# Patient Record
Sex: FEMALE | Race: BLACK OR AFRICAN AMERICAN | NOT HISPANIC OR LATINO | Employment: OTHER | URBAN - METROPOLITAN AREA
[De-identification: names, ages, dates, MRNs, and addresses within clinical notes are randomized per-mention and may not be internally consistent; named-entity substitution may affect disease eponyms.]

---

## 2017-01-01 ENCOUNTER — HOSPITAL ENCOUNTER (INPATIENT)
Facility: HOSPITAL | Age: 57
LOS: 3 days | DRG: 871 | End: 2017-03-10
Attending: EMERGENCY MEDICINE | Admitting: INTERNAL MEDICINE
Payer: MEDICAID

## 2017-01-01 VITALS
HEIGHT: 66 IN | RESPIRATION RATE: 41 BRPM | DIASTOLIC BLOOD PRESSURE: 51 MMHG | BODY MASS INDEX: 37.81 KG/M2 | SYSTOLIC BLOOD PRESSURE: 84 MMHG | WEIGHT: 235.25 LBS | HEART RATE: 42 BPM | OXYGEN SATURATION: 69 % | TEMPERATURE: 98 F

## 2017-01-01 DIAGNOSIS — J69.0 ASPIRATION PNEUMONIA OF RIGHT LOWER LOBE DUE TO GASTRIC SECRETIONS: ICD-10-CM

## 2017-01-01 DIAGNOSIS — E11.65 TYPE 2 DIABETES MELLITUS WITH HYPERGLYCEMIA, WITH LONG-TERM CURRENT USE OF INSULIN: ICD-10-CM

## 2017-01-01 DIAGNOSIS — T68.XXXA HYPOTHERMIA, INITIAL ENCOUNTER: ICD-10-CM

## 2017-01-01 DIAGNOSIS — A41.9 SEPTIC SHOCK: ICD-10-CM

## 2017-01-01 DIAGNOSIS — J96.02 ACUTE RESPIRATORY FAILURE WITH HYPERCAPNIA: ICD-10-CM

## 2017-01-01 DIAGNOSIS — G40.201 PARTIAL SYMPTOMATIC EPILEPSY WITH COMPLEX PARTIAL SEIZURES, NOT INTRACTABLE, WITH STATUS EPILEPTICUS: ICD-10-CM

## 2017-01-01 DIAGNOSIS — G93.1 ANOXIC BRAIN INJURY: ICD-10-CM

## 2017-01-01 DIAGNOSIS — G93.1 HYPOXIC ENCEPHALOPATHY: ICD-10-CM

## 2017-01-01 DIAGNOSIS — Z72.0 TOBACCO ABUSE: ICD-10-CM

## 2017-01-01 DIAGNOSIS — N17.9 AKI (ACUTE KIDNEY INJURY): ICD-10-CM

## 2017-01-01 DIAGNOSIS — Z79.4 TYPE 2 DIABETES MELLITUS WITH HYPERGLYCEMIA, WITH LONG-TERM CURRENT USE OF INSULIN: ICD-10-CM

## 2017-01-01 DIAGNOSIS — R40.2432 GLASGOW COMA SCALE TOTAL SCORE 3-8, AT ARRIVAL TO EMERGENCY DEPARTMENT: ICD-10-CM

## 2017-01-01 DIAGNOSIS — E87.8 ELECTROLYTE IMBALANCE: ICD-10-CM

## 2017-01-01 DIAGNOSIS — R41.82 ALTERED MENTAL STATUS: ICD-10-CM

## 2017-01-01 DIAGNOSIS — I46.9 CARDIAC ARREST: Primary | ICD-10-CM

## 2017-01-01 DIAGNOSIS — I25.10 CAD, MULTIPLE VESSEL: Chronic | ICD-10-CM

## 2017-01-01 DIAGNOSIS — J96.90 RESPIRATORY FAILURE: ICD-10-CM

## 2017-01-01 DIAGNOSIS — E87.20 METABOLIC ACIDOSIS: ICD-10-CM

## 2017-01-01 DIAGNOSIS — I25.5 ISCHEMIC CARDIOMYOPATHY: ICD-10-CM

## 2017-01-01 DIAGNOSIS — I49.01 VENTRICULAR FIBRILLATION: ICD-10-CM

## 2017-01-01 DIAGNOSIS — I10 UNCONTROLLED HYPERTENSION: ICD-10-CM

## 2017-01-01 DIAGNOSIS — R65.21 SEPTIC SHOCK: ICD-10-CM

## 2017-01-01 DIAGNOSIS — I95.9 HYPOTENSION, UNSPECIFIED HYPOTENSION TYPE: ICD-10-CM

## 2017-01-01 LAB
ABO + RH BLD: NORMAL
ALBUMIN SERPL BCP-MCNC: 2.9 G/DL
ALBUMIN SERPL BCP-MCNC: 3 G/DL
ALLENS TEST: ABNORMAL
ALP SERPL-CCNC: 122 U/L
ALP SERPL-CCNC: 95 U/L
ALT SERPL W/O P-5'-P-CCNC: 73 U/L
ALT SERPL W/O P-5'-P-CCNC: 82 U/L
ANION GAP SERPL CALC-SCNC: 13 MMOL/L
ANION GAP SERPL CALC-SCNC: 15 MMOL/L
ANION GAP SERPL CALC-SCNC: 17 MMOL/L
ANION GAP SERPL CALC-SCNC: 17 MMOL/L
ANION GAP SERPL CALC-SCNC: 18 MMOL/L
ANION GAP SERPL CALC-SCNC: 18 MMOL/L
ANION GAP SERPL CALC-SCNC: 19 MMOL/L
ANION GAP SERPL CALC-SCNC: 19 MMOL/L
ANION GAP SERPL CALC-SCNC: 20 MMOL/L
ANION GAP SERPL CALC-SCNC: 25 MMOL/L
APTT BLDCRRT: 37.8 SEC
APTT BLDCRRT: 54.5 SEC
APTT BLDCRRT: 54.6 SEC
APTT BLDCRRT: 65.1 SEC
APTT BLDCRRT: 78.1 SEC
APTT BLDCRRT: 79.4 SEC
APTT BLDCRRT: 87.7 SEC
APTT BLDCRRT: >150 SEC
APTT BLDCRRT: >150 SEC
APTT BLDCRRT: NORMAL SEC
APTT BLDCRRT: NORMAL SEC
AST SERPL-CCNC: 100 U/L
AST SERPL-CCNC: 98 U/L
BACTERIA #/AREA URNS HPF: ABNORMAL /HPF
BASOPHILS # BLD AUTO: 0.01 K/UL
BASOPHILS # BLD AUTO: 0.01 K/UL
BASOPHILS # BLD AUTO: ABNORMAL K/UL
BASOPHILS # BLD AUTO: ABNORMAL K/UL
BASOPHILS NFR BLD: 0 %
BASOPHILS NFR BLD: 0.1 %
BILIRUB DIRECT SERPL-MCNC: 0.2 MG/DL
BILIRUB SERPL-MCNC: 0.3 MG/DL
BILIRUB SERPL-MCNC: 0.4 MG/DL
BILIRUB UR QL STRIP: NEGATIVE
BLD GP AB SCN CELLS X3 SERPL QL: NORMAL
BNP SERPL-MCNC: 37 PG/ML
BUN SERPL-MCNC: 15 MG/DL
BUN SERPL-MCNC: 19 MG/DL
BUN SERPL-MCNC: 21 MG/DL
BUN SERPL-MCNC: 22 MG/DL
BUN SERPL-MCNC: 26 MG/DL
CALCIUM SERPL-MCNC: 8.8 MG/DL
CALCIUM SERPL-MCNC: 9 MG/DL
CALCIUM SERPL-MCNC: 9.1 MG/DL
CALCIUM SERPL-MCNC: 9.2 MG/DL
CALCIUM SERPL-MCNC: 9.3 MG/DL
CALCIUM SERPL-MCNC: 9.4 MG/DL
CALCIUM SERPL-MCNC: 9.5 MG/DL
CALCIUM SERPL-MCNC: 9.5 MG/DL
CHLORIDE SERPL-SCNC: 103 MMOL/L
CHLORIDE SERPL-SCNC: 104 MMOL/L
CHLORIDE SERPL-SCNC: 105 MMOL/L
CHLORIDE SERPL-SCNC: 106 MMOL/L
CHLORIDE SERPL-SCNC: 107 MMOL/L
CHLORIDE SERPL-SCNC: 108 MMOL/L
CHLORIDE SERPL-SCNC: 108 MMOL/L
CHLORIDE SERPL-SCNC: 99 MMOL/L
CHLORIDE SERPL-SCNC: 99 MMOL/L
CK SERPL-CCNC: 102 U/L
CLARITY UR: CLEAR
CO2 SERPL-SCNC: 11 MMOL/L
CO2 SERPL-SCNC: 12 MMOL/L
CO2 SERPL-SCNC: 12 MMOL/L
CO2 SERPL-SCNC: 13 MMOL/L
CO2 SERPL-SCNC: 17 MMOL/L
CO2 SERPL-SCNC: 18 MMOL/L
CO2 SERPL-SCNC: 21 MMOL/L
CO2 SERPL-SCNC: 22 MMOL/L
CO2 SERPL-SCNC: 24 MMOL/L
CO2 SERPL-SCNC: 28 MMOL/L
COLOR UR: YELLOW
CREAT SERPL-MCNC: 1.1 MG/DL
CREAT SERPL-MCNC: 1.2 MG/DL
CREAT SERPL-MCNC: 1.3 MG/DL
CREAT SERPL-MCNC: 1.3 MG/DL
CREAT SERPL-MCNC: 1.4 MG/DL
CREAT SERPL-MCNC: 1.5 MG/DL
CREAT SERPL-MCNC: 1.6 MG/DL
CREAT SERPL-MCNC: 1.6 MG/DL
CREAT SERPL-MCNC: 1.8 MG/DL
CREAT SERPL-MCNC: 2.3 MG/DL
DELSYS: ABNORMAL
DIASTOLIC DYSFUNCTION: NO
DIFFERENTIAL METHOD: ABNORMAL
EOSINOPHIL # BLD AUTO: 0 K/UL
EOSINOPHIL # BLD AUTO: 0 K/UL
EOSINOPHIL # BLD AUTO: ABNORMAL K/UL
EOSINOPHIL # BLD AUTO: ABNORMAL K/UL
EOSINOPHIL NFR BLD: 0 %
EOSINOPHIL NFR BLD: 0 %
EOSINOPHIL NFR BLD: 0.2 %
EOSINOPHIL NFR BLD: 2 %
ERYTHROCYTE [DISTWIDTH] IN BLOOD BY AUTOMATED COUNT: 14.6 %
ERYTHROCYTE [DISTWIDTH] IN BLOOD BY AUTOMATED COUNT: 14.6 %
ERYTHROCYTE [DISTWIDTH] IN BLOOD BY AUTOMATED COUNT: 14.7 %
ERYTHROCYTE [DISTWIDTH] IN BLOOD BY AUTOMATED COUNT: 15.4 %
ERYTHROCYTE [SEDIMENTATION RATE] IN BLOOD BY WESTERGREN METHOD: 20 MM/H
ERYTHROCYTE [SEDIMENTATION RATE] IN BLOOD BY WESTERGREN METHOD: 22 MM/H
EST. GFR  (AFRICAN AMERICAN): 26 ML/MIN/1.73 M^2
EST. GFR  (AFRICAN AMERICAN): 35 ML/MIN/1.73 M^2
EST. GFR  (AFRICAN AMERICAN): 40 ML/MIN/1.73 M^2
EST. GFR  (AFRICAN AMERICAN): 40 ML/MIN/1.73 M^2
EST. GFR  (AFRICAN AMERICAN): 44 ML/MIN/1.73 M^2
EST. GFR  (AFRICAN AMERICAN): 47 ML/MIN/1.73 M^2
EST. GFR  (AFRICAN AMERICAN): 52 ML/MIN/1.73 M^2
EST. GFR  (AFRICAN AMERICAN): 52 ML/MIN/1.73 M^2
EST. GFR  (AFRICAN AMERICAN): 57 ML/MIN/1.73 M^2
EST. GFR  (AFRICAN AMERICAN): >60 ML/MIN/1.73 M^2
EST. GFR  (NON AFRICAN AMERICAN): 23 ML/MIN/1.73 M^2
EST. GFR  (NON AFRICAN AMERICAN): 30 ML/MIN/1.73 M^2
EST. GFR  (NON AFRICAN AMERICAN): 35 ML/MIN/1.73 M^2
EST. GFR  (NON AFRICAN AMERICAN): 35 ML/MIN/1.73 M^2
EST. GFR  (NON AFRICAN AMERICAN): 38 ML/MIN/1.73 M^2
EST. GFR  (NON AFRICAN AMERICAN): 41 ML/MIN/1.73 M^2
EST. GFR  (NON AFRICAN AMERICAN): 45 ML/MIN/1.73 M^2
EST. GFR  (NON AFRICAN AMERICAN): 45 ML/MIN/1.73 M^2
EST. GFR  (NON AFRICAN AMERICAN): 50 ML/MIN/1.73 M^2
EST. GFR  (NON AFRICAN AMERICAN): 55 ML/MIN/1.73 M^2
FIO2: 100
FIO2: 100
FIO2: 30
FIO2: 35
FIO2: 50
GIANT PLATELETS BLD QL SMEAR: PRESENT
GLUCOSE SERPL-MCNC: 173 MG/DL
GLUCOSE SERPL-MCNC: 190 MG/DL
GLUCOSE SERPL-MCNC: 201 MG/DL
GLUCOSE SERPL-MCNC: 207 MG/DL
GLUCOSE SERPL-MCNC: 241 MG/DL
GLUCOSE SERPL-MCNC: 252 MG/DL
GLUCOSE SERPL-MCNC: 253 MG/DL
GLUCOSE SERPL-MCNC: 297 MG/DL
GLUCOSE SERPL-MCNC: 305 MG/DL (ref 70–110)
GLUCOSE SERPL-MCNC: 313 MG/DL
GLUCOSE SERPL-MCNC: 346 MG/DL
GLUCOSE SERPL-MCNC: 386 MG/DL
GLUCOSE SERPL-MCNC: 460 MG/DL
GLUCOSE UR QL STRIP: ABNORMAL
HCO3 UR-SCNC: 15 MMOL/L (ref 24–28)
HCO3 UR-SCNC: 18 MMOL/L (ref 24–28)
HCO3 UR-SCNC: 19.5 MMOL/L (ref 24–28)
HCO3 UR-SCNC: 22.4 MMOL/L (ref 24–28)
HCO3 UR-SCNC: 27.3 MMOL/L (ref 24–28)
HCT VFR BLD AUTO: 35.4 %
HCT VFR BLD AUTO: 35.8 %
HCT VFR BLD AUTO: 40 %
HCT VFR BLD AUTO: 41.5 %
HCT VFR BLD CALC: 44 %PCV (ref 36–54)
HGB BLD-MCNC: 11.9 G/DL
HGB BLD-MCNC: 12.4 G/DL
HGB BLD-MCNC: 12.9 G/DL
HGB BLD-MCNC: 14.1 G/DL
HGB UR QL STRIP: ABNORMAL
HYALINE CASTS #/AREA URNS LPF: 0 /LPF
INR PPP: 1
INR PPP: 1.1
INR PPP: 1.1
KETONES UR QL STRIP: NEGATIVE
LACTATE SERPL-SCNC: 2.5 MMOL/L
LACTATE SERPL-SCNC: 2.9 MMOL/L
LACTATE SERPL-SCNC: 4.9 MMOL/L
LACTATE SERPL-SCNC: 4.9 MMOL/L
LACTATE SERPL-SCNC: 5.6 MMOL/L
LACTATE SERPL-SCNC: 7.1 MMOL/L
LACTATE SERPL-SCNC: 7.7 MMOL/L
LACTATE SERPL-SCNC: 8.7 MMOL/L
LACTATE SERPL-SCNC: 9.4 MMOL/L
LACTATE SERPL-SCNC: >12 MMOL/L
LEUKOCYTE ESTERASE UR QL STRIP: ABNORMAL
LYMPHOCYTES # BLD AUTO: 1.4 K/UL
LYMPHOCYTES # BLD AUTO: 2.1 K/UL
LYMPHOCYTES # BLD AUTO: ABNORMAL K/UL
LYMPHOCYTES # BLD AUTO: ABNORMAL K/UL
LYMPHOCYTES NFR BLD: 19 %
LYMPHOCYTES NFR BLD: 44 %
LYMPHOCYTES NFR BLD: 7.7 %
LYMPHOCYTES NFR BLD: 8.6 %
MAGNESIUM SERPL-MCNC: 1.7 MG/DL
MAGNESIUM SERPL-MCNC: 1.7 MG/DL
MAGNESIUM SERPL-MCNC: 1.8 MG/DL
MAGNESIUM SERPL-MCNC: 2 MG/DL
MAGNESIUM SERPL-MCNC: 2 MG/DL
MAGNESIUM SERPL-MCNC: 2.1 MG/DL
MAGNESIUM SERPL-MCNC: 2.2 MG/DL
MAGNESIUM SERPL-MCNC: 2.3 MG/DL
MCH RBC QN AUTO: 28 PG
MCH RBC QN AUTO: 28.1 PG
MCH RBC QN AUTO: 28.4 PG
MCH RBC QN AUTO: 28.5 PG
MCHC RBC AUTO-ENTMCNC: 32.3 %
MCHC RBC AUTO-ENTMCNC: 33.6 %
MCHC RBC AUTO-ENTMCNC: 34 %
MCHC RBC AUTO-ENTMCNC: 34.6 %
MCV RBC AUTO: 81 FL
MCV RBC AUTO: 83 FL
MCV RBC AUTO: 84 FL
MCV RBC AUTO: 88 FL
METAMYELOCYTES NFR BLD MANUAL: 1 %
METAMYELOCYTES NFR BLD MANUAL: 8 %
MICROSCOPIC COMMENT: ABNORMAL
MODE: ABNORMAL
MONOCYTES # BLD AUTO: 0.7 K/UL
MONOCYTES # BLD AUTO: 1.3 K/UL
MONOCYTES # BLD AUTO: ABNORMAL K/UL
MONOCYTES # BLD AUTO: ABNORMAL K/UL
MONOCYTES NFR BLD: 1 %
MONOCYTES NFR BLD: 3.7 %
MONOCYTES NFR BLD: 4 %
MONOCYTES NFR BLD: 5.1 %
MYELOCYTES NFR BLD MANUAL: 4 %
NEUTROPHILS # BLD AUTO: 16.4 K/UL
NEUTROPHILS # BLD AUTO: 21 K/UL
NEUTROPHILS NFR BLD: 27 %
NEUTROPHILS NFR BLD: 49 %
NEUTROPHILS NFR BLD: 86.9 %
NEUTROPHILS NFR BLD: 88.3 %
NEUTS BAND NFR BLD MANUAL: 3 %
NEUTS BAND NFR BLD MANUAL: 38 %
NITRITE UR QL STRIP: NEGATIVE
NSE SERPL-MCNC: NORMAL UG/L
PATH REV BLD -IMP: NORMAL
PCO2 BLDA: 38.1 MMHG (ref 35–45)
PCO2 BLDA: 38.9 MMHG (ref 35–45)
PCO2 BLDA: 42.3 MMHG (ref 35–45)
PCO2 BLDA: 47.8 MMHG (ref 35–45)
PCO2 BLDA: 54.8 MMHG (ref 35–45)
PEEP: 5
PH SMN: 7.12 [PH] (ref 7.35–7.45)
PH SMN: 7.19 [PH] (ref 7.35–7.45)
PH SMN: 7.22 [PH] (ref 7.35–7.45)
PH SMN: 7.33 [PH] (ref 7.35–7.45)
PH SMN: 7.46 [PH] (ref 7.35–7.45)
PH UR STRIP: 6 [PH] (ref 5–8)
PHOSPHATE SERPL-MCNC: 3.6 MG/DL
PHOSPHATE SERPL-MCNC: 3.8 MG/DL
PHOSPHATE SERPL-MCNC: 4.2 MG/DL
PHOSPHATE SERPL-MCNC: 4.3 MG/DL
PHOSPHATE SERPL-MCNC: 4.7 MG/DL
PHOSPHATE SERPL-MCNC: 4.7 MG/DL
PHOSPHATE SERPL-MCNC: 4.8 MG/DL
PHOSPHATE SERPL-MCNC: 5.3 MG/DL
PHOSPHATE SERPL-MCNC: 5.4 MG/DL
PHOSPHATE SERPL-MCNC: 5.5 MG/DL
PLATELET # BLD AUTO: 214 K/UL
PLATELET # BLD AUTO: 243 K/UL
PLATELET # BLD AUTO: 245 K/UL
PLATELET # BLD AUTO: 287 K/UL
PLATELET BLD QL SMEAR: ABNORMAL
PLATELET BLD QL SMEAR: ABNORMAL
PMV BLD AUTO: 10.5 FL
PMV BLD AUTO: 10.5 FL
PMV BLD AUTO: 11.1 FL
PMV BLD AUTO: 11.2 FL
PO2 BLDA: 192 MMHG (ref 80–100)
PO2 BLDA: 402 MMHG (ref 80–100)
PO2 BLDA: 45 MMHG (ref 80–100)
PO2 BLDA: 53 MMHG (ref 40–60)
PO2 BLDA: 95 MMHG (ref 80–100)
POC BE: -11 MMOL/L
POC BE: -13 MMOL/L
POC BE: -3 MMOL/L
POC BE: -8 MMOL/L
POC BE: 4 MMOL/L
POC IONIZED CALCIUM: 1.2 MMOL/L (ref 1.06–1.42)
POC SATURATED O2: 100 % (ref 95–100)
POC SATURATED O2: 71 % (ref 95–100)
POC SATURATED O2: 75 % (ref 95–100)
POC SATURATED O2: 98 % (ref 95–100)
POC SATURATED O2: 99 % (ref 95–100)
POCT GLUCOSE: 152 MG/DL (ref 70–110)
POCT GLUCOSE: 179 MG/DL (ref 70–110)
POCT GLUCOSE: 183 MG/DL (ref 70–110)
POCT GLUCOSE: 192 MG/DL (ref 70–110)
POCT GLUCOSE: 197 MG/DL (ref 70–110)
POCT GLUCOSE: 197 MG/DL (ref 70–110)
POCT GLUCOSE: 200 MG/DL (ref 70–110)
POCT GLUCOSE: 204 MG/DL (ref 70–110)
POCT GLUCOSE: 209 MG/DL (ref 70–110)
POCT GLUCOSE: 225 MG/DL (ref 70–110)
POCT GLUCOSE: 232 MG/DL (ref 70–110)
POCT GLUCOSE: 302 MG/DL (ref 70–110)
POCT GLUCOSE: 358 MG/DL (ref 70–110)
POTASSIUM BLD-SCNC: 3 MMOL/L (ref 3.5–5.1)
POTASSIUM SERPL-SCNC: 2.8 MMOL/L
POTASSIUM SERPL-SCNC: 2.9 MMOL/L
POTASSIUM SERPL-SCNC: 3.2 MMOL/L
POTASSIUM SERPL-SCNC: 3.4 MMOL/L
POTASSIUM SERPL-SCNC: 3.4 MMOL/L
POTASSIUM SERPL-SCNC: 3.6 MMOL/L
POTASSIUM SERPL-SCNC: 3.9 MMOL/L
POTASSIUM SERPL-SCNC: 3.9 MMOL/L
POTASSIUM SERPL-SCNC: 4 MMOL/L
POTASSIUM SERPL-SCNC: 4.1 MMOL/L
POTASSIUM SERPL-SCNC: 4.1 MMOL/L
PROT SERPL-MCNC: 7.1 G/DL
PROT SERPL-MCNC: 7.1 G/DL
PROT UR QL STRIP: ABNORMAL
PROTHROMBIN TIME: 10.6 SEC
PROTHROMBIN TIME: 11 SEC
PROTHROMBIN TIME: 11.3 SEC
RBC # BLD AUTO: 4.25 M/UL
RBC # BLD AUTO: 4.42 M/UL
RBC # BLD AUTO: 4.55 M/UL
RBC # BLD AUTO: 4.95 M/UL
RBC #/AREA URNS HPF: 3 /HPF (ref 0–4)
RETIRED EF AND QEF - SEE NOTES: 60 (ref 55–65)
SAMPLE: ABNORMAL
SITE: ABNORMAL
SODIUM BLD-SCNC: 141 MMOL/L (ref 136–145)
SODIUM SERPL-SCNC: 135 MMOL/L
SODIUM SERPL-SCNC: 137 MMOL/L
SODIUM SERPL-SCNC: 137 MMOL/L
SODIUM SERPL-SCNC: 138 MMOL/L
SODIUM SERPL-SCNC: 139 MMOL/L
SODIUM SERPL-SCNC: 139 MMOL/L
SODIUM SERPL-SCNC: 140 MMOL/L
SP GR UR STRIP: 1.02 (ref 1–1.03)
TRIGL SERPL-MCNC: 380 MG/DL
TROPONIN I SERPL DL<=0.01 NG/ML-MCNC: 0.08 NG/ML
TROPONIN I SERPL DL<=0.01 NG/ML-MCNC: 1.98 NG/ML
TROPONIN I SERPL DL<=0.01 NG/ML-MCNC: 1.98 NG/ML
TROPONIN I SERPL DL<=0.01 NG/ML-MCNC: 4.54 NG/ML
URN SPEC COLLECT METH UR: ABNORMAL
UROBILINOGEN UR STRIP-ACNC: 1 EU/DL
VT: 450
VT: 450
VT: 500
WBC # BLD AUTO: 18.59 K/UL
WBC # BLD AUTO: 24.17 K/UL
WBC # BLD AUTO: 24.32 K/UL
WBC # BLD AUTO: 7.38 K/UL
WBC #/AREA URNS HPF: 5 /HPF (ref 0–5)
WBC CLUMPS URNS QL MICRO: ABNORMAL

## 2017-01-01 PROCEDURE — 85060 BLOOD SMEAR INTERPRETATION: CPT | Mod: ,,, | Performed by: PATHOLOGY

## 2017-01-01 PROCEDURE — 96365 THER/PROPH/DIAG IV INF INIT: CPT

## 2017-01-01 PROCEDURE — 99223 1ST HOSP IP/OBS HIGH 75: CPT | Mod: ,,, | Performed by: INTERNAL MEDICINE

## 2017-01-01 PROCEDURE — 99291 CRITICAL CARE FIRST HOUR: CPT | Mod: 25

## 2017-01-01 PROCEDURE — 99291 CRITICAL CARE FIRST HOUR: CPT | Mod: ,,, | Performed by: NURSE PRACTITIONER

## 2017-01-01 PROCEDURE — 82803 BLOOD GASES ANY COMBINATION: CPT

## 2017-01-01 PROCEDURE — 63600175 PHARM REV CODE 636 W HCPCS: Performed by: EMERGENCY MEDICINE

## 2017-01-01 PROCEDURE — 82962 GLUCOSE BLOOD TEST: CPT

## 2017-01-01 PROCEDURE — 25000003 PHARM REV CODE 250: Performed by: EMERGENCY MEDICINE

## 2017-01-01 PROCEDURE — 25000242 PHARM REV CODE 250 ALT 637 W/ HCPCS: Performed by: NURSE PRACTITIONER

## 2017-01-01 PROCEDURE — 85730 THROMBOPLASTIN TIME PARTIAL: CPT

## 2017-01-01 PROCEDURE — 25000003 PHARM REV CODE 250: Performed by: NURSE PRACTITIONER

## 2017-01-01 PROCEDURE — 85730 THROMBOPLASTIN TIME PARTIAL: CPT | Mod: 91

## 2017-01-01 PROCEDURE — 96366 THER/PROPH/DIAG IV INF ADDON: CPT

## 2017-01-01 PROCEDURE — 85027 COMPLETE CBC AUTOMATED: CPT

## 2017-01-01 PROCEDURE — 83520 IMMUNOASSAY QUANT NOS NONAB: CPT

## 2017-01-01 PROCEDURE — 63600175 PHARM REV CODE 636 W HCPCS: Performed by: NURSE PRACTITIONER

## 2017-01-01 PROCEDURE — 37799 UNLISTED PX VASCULAR SURGERY: CPT

## 2017-01-01 PROCEDURE — 80048 BASIC METABOLIC PNL TOTAL CA: CPT | Mod: 91

## 2017-01-01 PROCEDURE — 99233 SBSQ HOSP IP/OBS HIGH 50: CPT | Mod: ,,, | Performed by: INTERNAL MEDICINE

## 2017-01-01 PROCEDURE — 99291 CRITICAL CARE FIRST HOUR: CPT | Mod: ,,, | Performed by: PSYCHIATRY & NEUROLOGY

## 2017-01-01 PROCEDURE — 63600175 PHARM REV CODE 636 W HCPCS: Performed by: PSYCHIATRY & NEUROLOGY

## 2017-01-01 PROCEDURE — 99900026 HC AIRWAY MAINTENANCE (STAT)

## 2017-01-01 PROCEDURE — 80048 BASIC METABOLIC PNL TOTAL CA: CPT

## 2017-01-01 PROCEDURE — 63600175 PHARM REV CODE 636 W HCPCS: Performed by: INTERNAL MEDICINE

## 2017-01-01 PROCEDURE — 83605 ASSAY OF LACTIC ACID: CPT | Mod: 91

## 2017-01-01 PROCEDURE — 25000003 PHARM REV CODE 250: Performed by: INTERNAL MEDICINE

## 2017-01-01 PROCEDURE — 84132 ASSAY OF SERUM POTASSIUM: CPT

## 2017-01-01 PROCEDURE — 83735 ASSAY OF MAGNESIUM: CPT | Mod: 91

## 2017-01-01 PROCEDURE — 84100 ASSAY OF PHOSPHORUS: CPT | Mod: 91

## 2017-01-01 PROCEDURE — 97802 MEDICAL NUTRITION INDIV IN: CPT

## 2017-01-01 PROCEDURE — 99291 CRITICAL CARE FIRST HOUR: CPT | Mod: 25,,, | Performed by: NURSE PRACTITIONER

## 2017-01-01 PROCEDURE — 99199 UNLISTED SPECIAL SVC PX/RPRT: CPT

## 2017-01-01 PROCEDURE — 87186 SC STD MICRODIL/AGAR DIL: CPT

## 2017-01-01 PROCEDURE — 94640 AIRWAY INHALATION TREATMENT: CPT

## 2017-01-01 PROCEDURE — 85025 COMPLETE CBC W/AUTO DIFF WBC: CPT

## 2017-01-01 PROCEDURE — 99900035 HC TECH TIME PER 15 MIN (STAT)

## 2017-01-01 PROCEDURE — 81000 URINALYSIS NONAUTO W/SCOPE: CPT

## 2017-01-01 PROCEDURE — 83880 ASSAY OF NATRIURETIC PEPTIDE: CPT

## 2017-01-01 PROCEDURE — 85610 PROTHROMBIN TIME: CPT

## 2017-01-01 PROCEDURE — 99232 SBSQ HOSP IP/OBS MODERATE 35: CPT | Mod: ,,, | Performed by: INTERNAL MEDICINE

## 2017-01-01 PROCEDURE — 87147 CULTURE TYPE IMMUNOLOGIC: CPT

## 2017-01-01 PROCEDURE — 84484 ASSAY OF TROPONIN QUANT: CPT | Mod: 91

## 2017-01-01 PROCEDURE — B548ZZA ULTRASONOGRAPHY OF SUPERIOR VENA CAVA, GUIDANCE: ICD-10-PCS | Performed by: PEDIATRICS

## 2017-01-01 PROCEDURE — 93005 ELECTROCARDIOGRAM TRACING: CPT

## 2017-01-01 PROCEDURE — 80076 HEPATIC FUNCTION PANEL: CPT

## 2017-01-01 PROCEDURE — 94003 VENT MGMT INPAT SUBQ DAY: CPT

## 2017-01-01 PROCEDURE — 36556 INSERT NON-TUNNEL CV CATH: CPT

## 2017-01-01 PROCEDURE — 82947 ASSAY GLUCOSE BLOOD QUANT: CPT

## 2017-01-01 PROCEDURE — 20000000 HC ICU ROOM

## 2017-01-01 PROCEDURE — 93306 TTE W/DOPPLER COMPLETE: CPT | Mod: 26,,, | Performed by: INTERNAL MEDICINE

## 2017-01-01 PROCEDURE — 84100 ASSAY OF PHOSPHORUS: CPT

## 2017-01-01 PROCEDURE — 87205 SMEAR GRAM STAIN: CPT

## 2017-01-01 PROCEDURE — 95816 EEG AWAKE AND DROWSY: CPT

## 2017-01-01 PROCEDURE — 80053 COMPREHEN METABOLIC PANEL: CPT

## 2017-01-01 PROCEDURE — 99292 CRITICAL CARE ADDL 30 MIN: CPT | Mod: ,,, | Performed by: NURSE PRACTITIONER

## 2017-01-01 PROCEDURE — 85007 BL SMEAR W/DIFF WBC COUNT: CPT

## 2017-01-01 PROCEDURE — 96375 TX/PRO/DX INJ NEW DRUG ADDON: CPT

## 2017-01-01 PROCEDURE — 93306 TTE W/DOPPLER COMPLETE: CPT

## 2017-01-01 PROCEDURE — 99292 CRITICAL CARE ADDL 30 MIN: CPT | Mod: 25,,, | Performed by: NURSE PRACTITIONER

## 2017-01-01 PROCEDURE — 5A1945Z RESPIRATORY VENTILATION, 24-96 CONSECUTIVE HOURS: ICD-10-PCS | Performed by: EMERGENCY MEDICINE

## 2017-01-01 PROCEDURE — 02HV33Z INSERTION OF INFUSION DEVICE INTO SUPERIOR VENA CAVA, PERCUTANEOUS APPROACH: ICD-10-PCS | Performed by: EMERGENCY MEDICINE

## 2017-01-01 PROCEDURE — 87040 BLOOD CULTURE FOR BACTERIA: CPT | Mod: 59

## 2017-01-01 PROCEDURE — 36620 INSERTION CATHETER ARTERY: CPT | Mod: ,,, | Performed by: NURSE PRACTITIONER

## 2017-01-01 PROCEDURE — 95822 EEG COMA OR SLEEP ONLY: CPT | Mod: 26,,, | Performed by: PSYCHIATRY & NEUROLOGY

## 2017-01-01 PROCEDURE — 36415 COLL VENOUS BLD VENIPUNCTURE: CPT

## 2017-01-01 PROCEDURE — 93010 ELECTROCARDIOGRAM REPORT: CPT | Mod: ,,, | Performed by: INTERNAL MEDICINE

## 2017-01-01 PROCEDURE — 83735 ASSAY OF MAGNESIUM: CPT

## 2017-01-01 PROCEDURE — 82550 ASSAY OF CK (CPK): CPT

## 2017-01-01 PROCEDURE — 86850 RBC ANTIBODY SCREEN: CPT

## 2017-01-01 PROCEDURE — 84478 ASSAY OF TRIGLYCERIDES: CPT

## 2017-01-01 PROCEDURE — 94002 VENT MGMT INPAT INIT DAY: CPT

## 2017-01-01 PROCEDURE — P9612 CATHETERIZE FOR URINE SPEC: HCPCS

## 2017-01-01 PROCEDURE — 86900 BLOOD TYPING SEROLOGIC ABO: CPT

## 2017-01-01 PROCEDURE — 87070 CULTURE OTHR SPECIMN AEROBIC: CPT

## 2017-01-01 PROCEDURE — 87077 CULTURE AEROBIC IDENTIFY: CPT

## 2017-01-01 RX ORDER — MAGNESIUM SULFATE HEPTAHYDRATE 40 MG/ML
2 INJECTION, SOLUTION INTRAVENOUS
Status: COMPLETED | OUTPATIENT
Start: 2017-01-01 | End: 2017-01-01

## 2017-01-01 RX ORDER — INSULIN ASPART 100 [IU]/ML
1-10 INJECTION, SOLUTION INTRAVENOUS; SUBCUTANEOUS EVERY 6 HOURS PRN
Status: DISCONTINUED | OUTPATIENT
Start: 2017-01-01 | End: 2017-01-01

## 2017-01-01 RX ORDER — MORPHINE SULFATE 10 MG/ML
7 INJECTION INTRAMUSCULAR; INTRAVENOUS; SUBCUTANEOUS EVERY 30 MIN PRN
Status: DISCONTINUED | OUTPATIENT
Start: 2017-01-01 | End: 2017-01-01 | Stop reason: HOSPADM

## 2017-01-01 RX ORDER — ASPIRIN 300 MG/1
300 SUPPOSITORY RECTAL
Status: DISCONTINUED | OUTPATIENT
Start: 2017-01-01 | End: 2017-01-01

## 2017-01-01 RX ORDER — AMIODARONE HYDROCHLORIDE 200 MG/1
200 TABLET ORAL 2 TIMES DAILY
Status: DISCONTINUED | OUTPATIENT
Start: 2017-01-01 | End: 2017-01-01

## 2017-01-01 RX ORDER — ACETAMINOPHEN 650 MG/20.3ML
650 LIQUID ORAL EVERY 6 HOURS
Status: DISCONTINUED | OUTPATIENT
Start: 2017-01-01 | End: 2017-01-01

## 2017-01-01 RX ORDER — POTASSIUM CHLORIDE 29.8 MG/ML
40 INJECTION INTRAVENOUS ONCE
Status: COMPLETED | OUTPATIENT
Start: 2017-01-01 | End: 2017-01-01

## 2017-01-01 RX ORDER — POTASSIUM CHLORIDE 29.8 MG/ML
80 INJECTION INTRAVENOUS
Status: DISCONTINUED | OUTPATIENT
Start: 2017-01-01 | End: 2017-01-01

## 2017-01-01 RX ORDER — LEVETIRACETAM 15 MG/ML
1500 INJECTION INTRAVASCULAR EVERY 12 HOURS
Status: DISCONTINUED | OUTPATIENT
Start: 2017-01-01 | End: 2017-01-01

## 2017-01-01 RX ORDER — PROPOFOL 10 MG/ML
5 INJECTION, EMULSION INTRAVENOUS CONTINUOUS
Status: DISCONTINUED | OUTPATIENT
Start: 2017-01-01 | End: 2017-01-01

## 2017-01-01 RX ORDER — MAGNESIUM SULFATE HEPTAHYDRATE 40 MG/ML
2 INJECTION, SOLUTION INTRAVENOUS ONCE
Status: COMPLETED | OUTPATIENT
Start: 2017-01-01 | End: 2017-01-01

## 2017-01-01 RX ORDER — PROPOFOL 10 MG/ML
INJECTION, EMULSION INTRAVENOUS
Status: DISPENSED
Start: 2017-01-01 | End: 2017-01-01

## 2017-01-01 RX ORDER — ASPIRIN 325 MG
325 TABLET ORAL
Status: COMPLETED | OUTPATIENT
Start: 2017-01-01 | End: 2017-01-01

## 2017-01-01 RX ORDER — SODIUM CHLORIDE 9 MG/ML
INJECTION, SOLUTION INTRAVENOUS CONTINUOUS
Status: DISCONTINUED | OUTPATIENT
Start: 2017-01-01 | End: 2017-01-01

## 2017-01-01 RX ORDER — POTASSIUM CHLORIDE 29.8 MG/ML
40 INJECTION INTRAVENOUS
Status: COMPLETED | OUTPATIENT
Start: 2017-01-01 | End: 2017-01-01

## 2017-01-01 RX ORDER — ENOXAPARIN SODIUM 100 MG/ML
60 INJECTION SUBCUTANEOUS
Status: DISCONTINUED | OUTPATIENT
Start: 2017-01-01 | End: 2017-01-01

## 2017-01-01 RX ORDER — LEVETIRACETAM 10 MG/ML
1000 INJECTION INTRAVASCULAR EVERY 12 HOURS
Status: DISCONTINUED | OUTPATIENT
Start: 2017-01-01 | End: 2017-01-01

## 2017-01-01 RX ORDER — LORAZEPAM 2 MG/ML
INJECTION INTRAMUSCULAR
Status: DISPENSED
Start: 2017-01-01 | End: 2017-01-01

## 2017-01-01 RX ORDER — MAGNESIUM SULFATE HEPTAHYDRATE 40 MG/ML
2 INJECTION, SOLUTION INTRAVENOUS
Status: DISCONTINUED | OUTPATIENT
Start: 2017-01-01 | End: 2017-01-01

## 2017-01-01 RX ORDER — LORAZEPAM 2 MG/ML
2 INJECTION INTRAMUSCULAR EVERY 5 MIN PRN
Status: DISCONTINUED | OUTPATIENT
Start: 2017-01-01 | End: 2017-01-01 | Stop reason: HOSPADM

## 2017-01-01 RX ORDER — HEPARIN SODIUM,PORCINE/D5W 25000/250
16 INTRAVENOUS SOLUTION INTRAVENOUS CONTINUOUS
Status: DISCONTINUED | OUTPATIENT
Start: 2017-01-01 | End: 2017-01-01

## 2017-01-01 RX ORDER — METRONIDAZOLE 500 MG/100ML
500 INJECTION, SOLUTION INTRAVENOUS
Status: DISCONTINUED | OUTPATIENT
Start: 2017-01-01 | End: 2017-01-01

## 2017-01-01 RX ORDER — BUSPIRONE HYDROCHLORIDE 10 MG/1
30 TABLET ORAL ONCE
Status: DISCONTINUED | OUTPATIENT
Start: 2017-01-01 | End: 2017-01-01

## 2017-01-01 RX ORDER — HYDRALAZINE HYDROCHLORIDE 20 MG/ML
20 INJECTION INTRAMUSCULAR; INTRAVENOUS EVERY 6 HOURS PRN
Status: DISCONTINUED | OUTPATIENT
Start: 2017-01-01 | End: 2017-01-01

## 2017-01-01 RX ORDER — MEROPENEM AND SODIUM CHLORIDE 500 MG/50ML
500 INJECTION, SOLUTION INTRAVENOUS
Status: DISCONTINUED | OUTPATIENT
Start: 2017-01-01 | End: 2017-01-01

## 2017-01-01 RX ORDER — GLUCAGON 1 MG
1 KIT INJECTION
Status: DISCONTINUED | OUTPATIENT
Start: 2017-01-01 | End: 2017-01-01

## 2017-01-01 RX ORDER — LINEZOLID 2 MG/ML
600 INJECTION, SOLUTION INTRAVENOUS
Status: DISCONTINUED | OUTPATIENT
Start: 2017-01-01 | End: 2017-01-01

## 2017-01-01 RX ORDER — NOREPINEPHRINE BITARTRATE/D5W 4MG/250ML
0.02 PLASTIC BAG, INJECTION (ML) INTRAVENOUS CONTINUOUS
Status: DISCONTINUED | OUTPATIENT
Start: 2017-01-01 | End: 2017-01-01

## 2017-01-01 RX ORDER — ONDANSETRON 2 MG/ML
4 INJECTION INTRAMUSCULAR; INTRAVENOUS
Status: DISCONTINUED | OUTPATIENT
Start: 2017-01-01 | End: 2017-01-01

## 2017-01-01 RX ORDER — LORAZEPAM 2 MG/ML
2 INJECTION INTRAMUSCULAR ONCE
Status: COMPLETED | OUTPATIENT
Start: 2017-01-01 | End: 2017-01-01

## 2017-01-01 RX ORDER — FAMOTIDINE 10 MG/ML
20 INJECTION INTRAVENOUS 2 TIMES DAILY
Status: DISCONTINUED | OUTPATIENT
Start: 2017-01-01 | End: 2017-01-01

## 2017-01-01 RX ORDER — MAGNESIUM SULFATE HEPTAHYDRATE 40 MG/ML
4 INJECTION, SOLUTION INTRAVENOUS
Status: DISCONTINUED | OUTPATIENT
Start: 2017-01-01 | End: 2017-01-01

## 2017-01-01 RX ORDER — FAMOTIDINE 10 MG/ML
20 INJECTION INTRAVENOUS DAILY
Status: DISCONTINUED | OUTPATIENT
Start: 2017-01-01 | End: 2017-01-01

## 2017-01-01 RX ORDER — LORAZEPAM 2 MG/ML
1 INJECTION INTRAMUSCULAR
Status: COMPLETED | OUTPATIENT
Start: 2017-01-01 | End: 2017-01-01

## 2017-01-01 RX ORDER — NAPROXEN SODIUM 220 MG/1
81 TABLET, FILM COATED ORAL DAILY
Status: DISCONTINUED | OUTPATIENT
Start: 2017-01-01 | End: 2017-01-01

## 2017-01-01 RX ORDER — IPRATROPIUM BROMIDE AND ALBUTEROL SULFATE 2.5; .5 MG/3ML; MG/3ML
3 SOLUTION RESPIRATORY (INHALATION) EVERY 6 HOURS
Status: DISCONTINUED | OUTPATIENT
Start: 2017-01-01 | End: 2017-01-01

## 2017-01-01 RX ORDER — MEPERIDINE HYDROCHLORIDE 25 MG/ML
25 INJECTION INTRAMUSCULAR; INTRAVENOUS; SUBCUTANEOUS EVERY 4 HOURS PRN
Status: DISPENSED | OUTPATIENT
Start: 2017-01-01 | End: 2017-01-01

## 2017-01-01 RX ORDER — POTASSIUM CHLORIDE 29.8 MG/ML
40 INJECTION INTRAVENOUS
Status: DISCONTINUED | OUTPATIENT
Start: 2017-01-01 | End: 2017-01-01

## 2017-01-01 RX ORDER — NOREPINEPHRINE BITARTRATE/D5W 4MG/250ML
0.2 PLASTIC BAG, INJECTION (ML) INTRAVENOUS CONTINUOUS
Status: DISCONTINUED | OUTPATIENT
Start: 2017-01-01 | End: 2017-01-01

## 2017-01-01 RX ORDER — METOPROLOL TARTRATE 1 MG/ML
5 INJECTION, SOLUTION INTRAVENOUS EVERY 6 HOURS
Status: DISCONTINUED | OUTPATIENT
Start: 2017-01-01 | End: 2017-01-01

## 2017-01-01 RX ORDER — ATORVASTATIN CALCIUM 10 MG/1
20 TABLET, FILM COATED ORAL DAILY
Status: DISCONTINUED | OUTPATIENT
Start: 2017-01-01 | End: 2017-01-01

## 2017-01-01 RX ORDER — ACETAMINOPHEN 325 MG/1
650 TABLET ORAL EVERY 6 HOURS PRN
Status: DISCONTINUED | OUTPATIENT
Start: 2017-01-01 | End: 2017-01-01

## 2017-01-01 RX ORDER — PROPOFOL 10 MG/ML
20 INJECTION, EMULSION INTRAVENOUS
Status: COMPLETED | OUTPATIENT
Start: 2017-01-01 | End: 2017-01-01

## 2017-01-01 RX ORDER — HYDRALAZINE HYDROCHLORIDE 20 MG/ML
10 INJECTION INTRAMUSCULAR; INTRAVENOUS EVERY 6 HOURS PRN
Status: DISCONTINUED | OUTPATIENT
Start: 2017-01-01 | End: 2017-01-01

## 2017-01-01 RX ORDER — POTASSIUM CHLORIDE 14.9 MG/ML
60 INJECTION INTRAVENOUS
Status: DISCONTINUED | OUTPATIENT
Start: 2017-01-01 | End: 2017-01-01

## 2017-01-01 RX ORDER — MORPHINE SULFATE 2 MG/ML
7 INJECTION, SOLUTION INTRAMUSCULAR; INTRAVENOUS EVERY 30 MIN PRN
Status: DISCONTINUED | OUTPATIENT
Start: 2017-01-01 | End: 2017-01-01 | Stop reason: RX

## 2017-01-01 RX ORDER — CHLORHEXIDINE GLUCONATE ORAL RINSE 1.2 MG/ML
15 SOLUTION DENTAL 2 TIMES DAILY
Status: DISCONTINUED | OUTPATIENT
Start: 2017-01-01 | End: 2017-01-01

## 2017-01-01 RX ORDER — CLOPIDOGREL BISULFATE 75 MG/1
75 TABLET ORAL DAILY
Status: DISCONTINUED | OUTPATIENT
Start: 2017-01-01 | End: 2017-01-01

## 2017-01-01 RX ORDER — DOPAMINE HYDROCHLORIDE 160 MG/100ML
10 INJECTION, SOLUTION INTRAVENOUS CONTINUOUS
Status: DISCONTINUED | OUTPATIENT
Start: 2017-01-01 | End: 2017-01-01

## 2017-01-01 RX ADMIN — MEROPENEM AND SODIUM CHLORIDE 500 MG: 500 INJECTION, SOLUTION INTRAVENOUS at 07:03

## 2017-01-01 RX ADMIN — ASPIRIN 81 MG CHEWABLE TABLET 81 MG: 81 TABLET CHEWABLE at 09:03

## 2017-01-01 RX ADMIN — INSULIN HUMAN 10 UNITS: 100 INJECTION, SOLUTION PARENTERAL at 05:03

## 2017-01-01 RX ADMIN — PROPOFOL 39.97 MCG/KG/MIN: 10 INJECTION, EMULSION INTRAVENOUS at 09:03

## 2017-01-01 RX ADMIN — PROPOFOL 50 MCG/KG/MIN: 10 INJECTION, EMULSION INTRAVENOUS at 09:03

## 2017-01-01 RX ADMIN — HEPARIN SODIUM AND DEXTROSE 9 UNITS/KG/HR: 10000; 5 INJECTION INTRAVENOUS at 05:03

## 2017-01-01 RX ADMIN — AMIODARONE HYDROCHLORIDE 200 MG: 200 TABLET ORAL at 11:03

## 2017-01-01 RX ADMIN — INSULIN ASPART 8 UNITS: 100 INJECTION, SOLUTION INTRAVENOUS; SUBCUTANEOUS at 06:03

## 2017-01-01 RX ADMIN — MAGNESIUM SULFATE IN WATER 2 G: 40 INJECTION, SOLUTION INTRAVENOUS at 07:03

## 2017-01-01 RX ADMIN — MAGNESIUM SULFATE IN WATER 2 G: 40 INJECTION, SOLUTION INTRAVENOUS at 03:03

## 2017-01-01 RX ADMIN — FAMOTIDINE 20 MG: 10 INJECTION, SOLUTION INTRAVENOUS at 04:03

## 2017-01-01 RX ADMIN — HEPARIN SODIUM AND DEXTROSE 13 UNITS/KG/HR: 10000; 5 INJECTION INTRAVENOUS at 01:03

## 2017-01-01 RX ADMIN — SODIUM BICARBONATE: 84 INJECTION, SOLUTION INTRAVENOUS at 04:03

## 2017-01-01 RX ADMIN — PROPOFOL 50 MCG/KG/MIN: 10 INJECTION, EMULSION INTRAVENOUS at 08:03

## 2017-01-01 RX ADMIN — AMIODARONE HYDROCHLORIDE 0.5 MG/MIN: 1.8 INJECTION, SOLUTION INTRAVENOUS at 05:03

## 2017-01-01 RX ADMIN — INSULIN ASPART 2 UNITS: 100 INJECTION, SOLUTION INTRAVENOUS; SUBCUTANEOUS at 03:03

## 2017-01-01 RX ADMIN — ASPIRIN 325 MG ORAL TABLET 325 MG: 325 PILL ORAL at 12:03

## 2017-01-01 RX ADMIN — IPRATROPIUM BROMIDE AND ALBUTEROL SULFATE 3 ML: .5; 3 SOLUTION RESPIRATORY (INHALATION) at 07:03

## 2017-01-01 RX ADMIN — ENOXAPARIN SODIUM 60 MG: 100 INJECTION SUBCUTANEOUS at 12:03

## 2017-01-01 RX ADMIN — PROPOFOL 50 MCG/KG/MIN: 10 INJECTION, EMULSION INTRAVENOUS at 11:03

## 2017-01-01 RX ADMIN — DOPAMINE HYDROCHLORIDE 10 MCG/KG/MIN: 160 INJECTION, SOLUTION INTRAVENOUS at 10:03

## 2017-01-01 RX ADMIN — INSULIN ASPART 2 UNITS: 100 INJECTION, SOLUTION INTRAVENOUS; SUBCUTANEOUS at 12:03

## 2017-01-01 RX ADMIN — METOPROLOL TARTRATE 5 MG: 5 INJECTION INTRAVENOUS at 09:03

## 2017-01-01 RX ADMIN — PROPOFOL 50 MCG/KG/MIN: 10 INJECTION, EMULSION INTRAVENOUS at 02:03

## 2017-01-01 RX ADMIN — AMIODARONE HYDROCHLORIDE 0.5 MG/MIN: 1.8 INJECTION, SOLUTION INTRAVENOUS at 09:03

## 2017-01-01 RX ADMIN — FAMOTIDINE 20 MG: 10 INJECTION, SOLUTION INTRAVENOUS at 08:03

## 2017-01-01 RX ADMIN — LEVETIRACETAM 1500 MG: 15 INJECTION INTRAVENOUS at 09:03

## 2017-01-01 RX ADMIN — INSULIN ASPART 4 UNITS: 100 INJECTION, SOLUTION INTRAVENOUS; SUBCUTANEOUS at 05:03

## 2017-01-01 RX ADMIN — SODIUM CHLORIDE: 0.9 INJECTION, SOLUTION INTRAVENOUS at 09:03

## 2017-01-01 RX ADMIN — AMIODARONE HYDROCHLORIDE 0.5 MG/MIN: 1.8 INJECTION, SOLUTION INTRAVENOUS at 02:03

## 2017-01-01 RX ADMIN — METOPROLOL TARTRATE 5 MG: 5 INJECTION INTRAVENOUS at 05:03

## 2017-01-01 RX ADMIN — ATORVASTATIN CALCIUM 20 MG: 10 TABLET, FILM COATED ORAL at 09:03

## 2017-01-01 RX ADMIN — LORAZEPAM 1 MG: 2 INJECTION, SOLUTION INTRAMUSCULAR; INTRAVENOUS at 09:03

## 2017-01-01 RX ADMIN — PROPOFOL 50 MCG/KG/MIN: 10 INJECTION, EMULSION INTRAVENOUS at 03:03

## 2017-01-01 RX ADMIN — INSULIN ASPART 3 UNITS: 100 INJECTION, SOLUTION INTRAVENOUS; SUBCUTANEOUS at 12:03

## 2017-01-01 RX ADMIN — VECURONIUM BROMIDE 1 MCG/KG/MIN: 1 INJECTION, POWDER, LYOPHILIZED, FOR SOLUTION INTRAVENOUS at 11:03

## 2017-01-01 RX ADMIN — PROPOFOL 50 MCG/KG/MIN: 10 INJECTION, EMULSION INTRAVENOUS at 05:03

## 2017-01-01 RX ADMIN — Medication 0.02 MCG/KG/MIN: at 07:03

## 2017-01-01 RX ADMIN — PROPOFOL 20 MCG/KG/MIN: 10 INJECTION, EMULSION INTRAVENOUS at 09:03

## 2017-01-01 RX ADMIN — INSULIN DETEMIR 10 UNITS: 100 INJECTION, SOLUTION SUBCUTANEOUS at 08:03

## 2017-01-01 RX ADMIN — HYDRALAZINE HYDROCHLORIDE 10 MG: 20 INJECTION INTRAMUSCULAR; INTRAVENOUS at 08:03

## 2017-01-01 RX ADMIN — PROPOFOL 30 MCG/KG/MIN: 10 INJECTION, EMULSION INTRAVENOUS at 12:03

## 2017-01-01 RX ADMIN — CHLORHEXIDINE GLUCONATE 15 ML: 1.2 RINSE ORAL at 08:03

## 2017-01-01 RX ADMIN — INSULIN ASPART 2 UNITS: 100 INJECTION, SOLUTION INTRAVENOUS; SUBCUTANEOUS at 06:03

## 2017-01-01 RX ADMIN — IPRATROPIUM BROMIDE AND ALBUTEROL SULFATE 3 ML: .5; 3 SOLUTION RESPIRATORY (INHALATION) at 01:03

## 2017-01-01 RX ADMIN — NOREPINEPHRINE BITARTRATE 0.3 MCG/KG/MIN: 1 INJECTION, SOLUTION, CONCENTRATE INTRAVENOUS at 08:03

## 2017-01-01 RX ADMIN — METOPROLOL TARTRATE 5 MG: 5 INJECTION INTRAVENOUS at 11:03

## 2017-01-01 RX ADMIN — LEVETIRACETAM 1500 MG: 15 INJECTION INTRAVENOUS at 08:03

## 2017-01-01 RX ADMIN — HEPARIN SODIUM AND DEXTROSE 16 UNITS/KG/HR: 10000; 5 INJECTION INTRAVENOUS at 11:03

## 2017-01-01 RX ADMIN — SODIUM CHLORIDE: 0.9 INJECTION, SOLUTION INTRAVENOUS at 11:03

## 2017-01-01 RX ADMIN — CHLORHEXIDINE GLUCONATE 15 ML: 1.2 RINSE ORAL at 09:03

## 2017-01-01 RX ADMIN — SODIUM CHLORIDE 1000 ML: 0.9 INJECTION, SOLUTION INTRAVENOUS at 07:03

## 2017-01-01 RX ADMIN — PROPOFOL 50 MCG/KG/MIN: 10 INJECTION, EMULSION INTRAVENOUS at 06:03

## 2017-01-01 RX ADMIN — MAGNESIUM SULFATE IN WATER 2 G: 40 INJECTION, SOLUTION INTRAVENOUS at 10:03

## 2017-01-01 RX ADMIN — POTASSIUM CHLORIDE 40 MEQ: 400 INJECTION, SOLUTION INTRAVENOUS at 09:03

## 2017-01-01 RX ADMIN — HEPARIN SODIUM AND DEXTROSE 9.09 UNITS/KG/HR: 10000; 5 INJECTION INTRAVENOUS at 06:03

## 2017-01-01 RX ADMIN — IPRATROPIUM BROMIDE AND ALBUTEROL SULFATE 3 ML: .5; 3 SOLUTION RESPIRATORY (INHALATION) at 12:03

## 2017-01-01 RX ADMIN — INSULIN ASPART 4 UNITS: 100 INJECTION, SOLUTION INTRAVENOUS; SUBCUTANEOUS at 07:03

## 2017-01-01 RX ADMIN — PROPOFOL 30 MCG/KG/MIN: 10 INJECTION, EMULSION INTRAVENOUS at 07:03

## 2017-01-01 RX ADMIN — LORAZEPAM 2 MG: 2 INJECTION, SOLUTION INTRAMUSCULAR; INTRAVENOUS at 06:03

## 2017-01-01 RX ADMIN — PROPOFOL 40 MCG/KG/MIN: 10 INJECTION, EMULSION INTRAVENOUS at 05:03

## 2017-01-01 RX ADMIN — SODIUM BICARBONATE: 84 INJECTION, SOLUTION INTRAVENOUS at 08:03

## 2017-01-01 RX ADMIN — LINEZOLID 600 MG: 600 INJECTION, SOLUTION INTRAVENOUS at 07:03

## 2017-01-01 RX ADMIN — PROPOFOL 50 MCG/KG/MIN: 10 INJECTION, EMULSION INTRAVENOUS at 04:03

## 2017-01-01 RX ADMIN — INSULIN ASPART 1 UNITS: 100 INJECTION, SOLUTION INTRAVENOUS; SUBCUTANEOUS at 11:03

## 2017-01-01 RX ADMIN — AMIODARONE HYDROCHLORIDE 0.5 MG/MIN: 1.8 INJECTION, SOLUTION INTRAVENOUS at 11:03

## 2017-01-01 RX ADMIN — HEPARIN SODIUM AND DEXTROSE 18 UNITS/KG/HR: 10000; 5 INJECTION INTRAVENOUS at 06:03

## 2017-01-01 RX ADMIN — ACETAMINOPHEN 650 MG: 160 SOLUTION ORAL at 12:03

## 2017-01-01 RX ADMIN — POTASSIUM CHLORIDE 40 MEQ: 400 INJECTION, SOLUTION INTRAVENOUS at 04:03

## 2017-01-01 RX ADMIN — AMIODARONE HYDROCHLORIDE 0.5 MG/MIN: 1.8 INJECTION, SOLUTION INTRAVENOUS at 03:03

## 2017-01-01 RX ADMIN — PROPOFOL 40 MCG/KG/MIN: 10 INJECTION, EMULSION INTRAVENOUS at 08:03

## 2017-01-01 RX ADMIN — INSULIN ASPART 4 UNITS: 100 INJECTION, SOLUTION INTRAVENOUS; SUBCUTANEOUS at 06:03

## 2017-01-01 RX ADMIN — HYDRALAZINE HYDROCHLORIDE 10 MG: 20 INJECTION INTRAMUSCULAR; INTRAVENOUS at 03:03

## 2017-01-01 RX ADMIN — PROPOFOL 39.97 MCG/KG/MIN: 10 INJECTION, EMULSION INTRAVENOUS at 02:03

## 2017-01-01 RX ADMIN — INSULIN ASPART 2 UNITS: 100 INJECTION, SOLUTION INTRAVENOUS; SUBCUTANEOUS at 04:03

## 2017-01-01 RX ADMIN — FAMOTIDINE 20 MG: 10 INJECTION INTRAVENOUS at 09:03

## 2017-01-01 RX ADMIN — INSULIN ASPART 4 UNITS: 100 INJECTION, SOLUTION INTRAVENOUS; SUBCUTANEOUS at 11:03

## 2017-01-01 RX ADMIN — ASPIRIN 81 MG CHEWABLE TABLET 81 MG: 81 TABLET CHEWABLE at 12:03

## 2017-01-01 RX ADMIN — SODIUM BICARBONATE: 84 INJECTION, SOLUTION INTRAVENOUS at 11:03

## 2017-01-01 RX ADMIN — MEPERIDINE HYDROCHLORIDE 25 MG: 25 INJECTION INTRAMUSCULAR; INTRAVENOUS; SUBCUTANEOUS at 10:03

## 2017-01-01 RX ADMIN — AMIODARONE HYDROCHLORIDE 0.5 MG/MIN: 1.8 INJECTION, SOLUTION INTRAVENOUS at 10:03

## 2017-01-01 RX ADMIN — POTASSIUM CHLORIDE 60 MEQ: 200 INJECTION, SOLUTION INTRAVENOUS at 10:03

## 2017-01-01 RX ADMIN — FAMOTIDINE 20 MG: 10 INJECTION, SOLUTION INTRAVENOUS at 09:03

## 2017-01-01 RX ADMIN — PROPOFOL 5 MCG/KG/MIN: 10 INJECTION, EMULSION INTRAVENOUS at 10:03

## 2017-01-01 RX ADMIN — PROPOFOL 50 MCG/KG/MIN: 10 INJECTION, EMULSION INTRAVENOUS at 12:03

## 2017-01-01 RX ADMIN — POTASSIUM CHLORIDE 40 MEQ: 400 INJECTION, SOLUTION INTRAVENOUS at 06:03

## 2017-01-01 RX ADMIN — ACETAMINOPHEN 650 MG: 325 TABLET ORAL at 09:03

## 2017-01-01 RX ADMIN — CLOPIDOGREL BISULFATE 75 MG: 75 TABLET ORAL at 11:03

## 2017-01-01 RX ADMIN — PROPOFOL 30 MCG/KG/MIN: 10 INJECTION, EMULSION INTRAVENOUS at 02:03

## 2017-01-01 RX ADMIN — PROPOFOL 50 MCG/KG/MIN: 10 INJECTION, EMULSION INTRAVENOUS at 10:03

## 2017-03-07 PROBLEM — I25.5 ISCHEMIC CARDIOMYOPATHY: Status: ACTIVE | Noted: 2017-01-01

## 2017-03-07 PROBLEM — E11.65 TYPE 2 DIABETES MELLITUS WITH HYPERGLYCEMIA: Status: ACTIVE | Noted: 2017-01-01

## 2017-03-07 PROBLEM — R94.31 ABNORMAL ECG: Status: ACTIVE | Noted: 2017-01-01

## 2017-03-07 PROBLEM — J96.02 ACUTE RESPIRATORY FAILURE WITH HYPERCAPNIA: Status: ACTIVE | Noted: 2017-01-01

## 2017-03-07 PROBLEM — Z72.0 TOBACCO ABUSE: Status: ACTIVE | Noted: 2017-01-01

## 2017-03-07 PROBLEM — Z98.61 HISTORY OF PTCA: Status: ACTIVE | Noted: 2017-01-01

## 2017-03-07 PROBLEM — I46.9 CARDIAC ARREST: Status: ACTIVE | Noted: 2017-01-01

## 2017-03-07 PROBLEM — R40.2430 GLASGOW COMA SCALE TOTAL SCORE 3-8: Status: ACTIVE | Noted: 2017-01-01

## 2017-03-07 PROBLEM — Z86.73 HISTORY OF CVA (CEREBROVASCULAR ACCIDENT): Status: ACTIVE | Noted: 2017-01-01

## 2017-03-07 PROBLEM — J96.90 RESPIRATORY FAILURE: Status: ACTIVE | Noted: 2017-01-01

## 2017-03-07 PROBLEM — I25.10 CAD, MULTIPLE VESSEL: Status: ACTIVE | Noted: 2017-01-01

## 2017-03-07 PROBLEM — I49.01 VENTRICULAR FIBRILLATION: Status: ACTIVE | Noted: 2017-01-01

## 2017-03-07 NOTE — ED NOTES
Per Gamal, hospitalist PA, the goal while pt is on Vec is to keep train of four 2/4. Verbal order given for changes

## 2017-03-07 NOTE — PLAN OF CARE
Problem: Patient Care Overview  Goal: Plan of Care Review  Outcome: Ongoing (interventions implemented as appropriate)  Patient currently on vent support. Will continue to monitor.

## 2017-03-07 NOTE — CONSULTS
"[facility] -   Cardiology  Consult Note    Patient Name: Tara Anderson  MRN: [id number]  Admission Date: 3/7/2017  Hospital Length of Stay: 0 days  Code Status: Full Code   Attending Provider: Ant Casas MD   Consulting Provider: Dionte Killian MD  Primary Care Physician: Primary Doctor No  Principal Problem:Cardiac arrest    Patient information was obtained from relative(s), EMS personnel, past medical records and ER records.     Consults  Subjective:     Chief Complaint:  Cardiac Arrest     HPI: Pt presents with cardiac arrest.  History obtained from EMS personnel, ER physician and son.  Pt was in usual state of health this am at home and had cardiac arrest.  No reports of any cp sxs or dyspnea.  Had some LE edema per son.  EMS performed CPR approx 20 minutes, defibrillation x 4 for V fib.  In ER, per ER physician pt had signs of possible anoxic brain injury with decreased GCS score.  ECG in ER showed NSR, nonspecific Q waves V1-V2, lateral ST-T abnl.  No stemi.  Per son, she was on coumadin and then Eliquis for h/o CVA.  Reportedly she has been off Eliquis x one month but may be taking some other blood thinner pill.  She had stents 4 - 5 years ago at [facility] after being turned down for CABG because "her heart was not strong enough".      Past Medical History:   Diagnosis Date    CAD, multiple vessel 3/7/2017    Cardiac arrest 3/7/2017    CHF (congestive heart failure)     Diabetes mellitus     History of CVA (cerebrovascular accident) 3/7/2017    History of PTCA 3/7/2017    Hypertension     Ischemic cardiomyopathy 3/7/2017    Tobacco abuse 3/7/2017    Ventricular fibrillation 3/7/2017       No past surgical history on file.    Review of patient's allergies indicates:   Allergen Reactions    Pcn [penicillins]        No current facility-administered medications on file prior to encounter.      Current Outpatient Prescriptions on File Prior to Encounter   Medication Sig    " amitriptyline (ELAVIL) 25 MG tablet Take 25 mg by mouth nightly as needed.    amlodipine (NORVASC) 10 MG tablet Take 10 mg by mouth once daily.    atorvastatin (LIPITOR) 40 MG tablet Take 40 mg by mouth once daily.    ciprofloxacin (CIPRO) 250 MG tablet Take 250 mg by mouth 2 (two) times daily.    clindamycin (CLEOCIN) 300 MG capsule Take 300 mg by mouth 3 (three) times daily.    clopidogrel (PLAVIX) 75 mg tablet Take 75 mg by mouth once daily.    dipyridamole-aspirin 200-25 mg (AGGRENOX) 200-25 mg CM12 Take 1 capsule by mouth 2 (two) times daily.    furosemide (LASIX) 40 MG tablet Take 40 mg by mouth 2 (two) times daily.    gabapentin (NEURONTIN) 100 MG capsule Take 100 mg by mouth 3 (three) times daily.    hydrALAZINE (APRESOLINE) 50 MG tablet Take 50 mg by mouth 3 (three) times daily.    hydrochlorothiazide (HYDRODIURIL) 12.5 MG Tab Take 25 mg by mouth once daily.     hydrOXYzine (ATARAX) 25 MG tablet Take 25 mg by mouth 4 (four) times daily.    insulin glargine (LANTUS) 100 unit/mL injection Inject into the skin every evening.    isosorbide mononitrate (IMDUR) 30 MG 24 hr tablet Take 30 mg by mouth once daily.    lisinopril 10 MG tablet Take 40 mg by mouth once daily.     metoprolol tartrate (LOPRESSOR) 25 MG tablet Take 50 mg by mouth 2 (two) times daily.     predniSONE (DELTASONE) 20 MG tablet Take 40 mg by mouth once daily.    warfarin (COUMADIN) 5 MG tablet Take 5 mg by mouth once daily.     Family History     None        Social History Main Topics    Smoking status: Never Smoker    Smokeless tobacco: Not on file    Alcohol use No    Drug use: No    Sexual activity: No     ROS   Review of Systems - unable to perform due to pt being unresponsive.    Objective:     Vital Signs (Most Recent):  Temp: (!) 93.8 °F (34.3 °C) (03/07/17 0901)  Pulse: 93 (03/07/17 0904)  Resp: 17 (03/07/17 0904)  BP: (!) 179/109 (03/07/17 0904)  SpO2: 100 % (03/07/17 0904) Vital Signs (24h Range):  Temp:  [93.8  °F (34.3 °C)] 93.8 °F (34.3 °C)  Pulse:  [82-93] 93  Resp:  [15-17] 17  SpO2:  [100 %] 100 %  BP: (137-179)/() 179/109     Weight: 103.4 kg (228 lb)  Body mass index is 36.8 kg/(m^2).    SpO2: 100 %  O2 Device (Oxygen Therapy): ventilator    No intake or output data in the 24 hours ending 03/07/17 0918    Lines/Drains/Airways     Airway                 Airway - Non-Surgical 03/07/17 0812 less than 1 day          Peripheral Intravenous Line                 Peripheral IV - Single Lumen 03/07/17 0845 Right Antecubital less than 1 day                Physical Exam   Physical Examination: General appearance - unresponsive pt in critical condition  Mental status - unresponsive  Neck - supple, no significant adenopathy, carotids upstroke normal bilaterally, no bruits  Chest - clear to auscultation, no wheezes, rales or rhonchi, symmetric air entry  Heart - normal rate, regular rhythm, normal S1, S2, no murmurs, rubs, clicks or gallops  Abdomen - soft, nontender, nondistended, no masses or organomegaly  obese  Neurological - unresponsive  Musculoskeletal - no joint tenderness, deformity or swelling  Extremities - diminished peripheral pulses, trace edema B LE  Skin - normal coloration and turgor, no rashes, no suspicious skin lesions noted    Significant Labs:   CMP No results for input(s): NA, K, CL, CO2, GLU, BUN, CREATININE, CALCIUM, PROT, ALBUMIN, BILITOT, ALKPHOS, AST, ALT, ANIONGAP, ESTGFRAFRICA, EGFRNONAA in the last 48 hours., CBC   Recent Labs  Lab 03/07/17  0855   WBC 24.17*   HGB 12.9   HCT 40.0       and Troponin No results for input(s): TROPONINI in the last 48 hours.    I have reviewed all pertinent labs and cardiac studies.      Assessment and Plan:     Active Diagnoses:    Diagnosis Date Noted POA    PRINCIPAL PROBLEM:  Cardiac arrest [I46.9] 03/07/2017 Yes    Ventricular fibrillation [I49.01] 03/07/2017 Yes    Ischemic cardiomyopathy [I25.5] 03/07/2017 Yes    CAD, multiple vessel [I25.10]  03/07/2017 Yes    History of PTCA [Z98.61] 03/07/2017 Not Applicable    History of CVA (cerebrovascular accident) [Z86.73] 03/07/2017 Not Applicable    Abnormal ECG [R94.31] 03/07/2017 Yes    Tobacco abuse [Z72.0] 03/07/2017 Yes      Problems Resolved During this Admission:    Diagnosis Date Noted Date Resolved POA       VTE Risk Mitigation     None          OUT OF HOSPITAL CARDIAC ARREST  V FIB  NO REPORTS OF RECENT ANGINAL SXS  NO STEMI  PROBABLE ANOXIC BRAIN INJURY  H/O MULTIVESSEL CAD, ISCHEMIC CARDIOMYOPATHY  I DISCUSSED WITH SON IN DETAIL.      RECOMMENDATIONS:      HYPOTHERMIA PROTOCOL  HEAD CT TO ENSURE NO BLEED, IF NEGATIVE THEN START IV HEPARIN GTT.  ASA QD  AMIODARONE GTT  SERIAL TROPONIN LEVELS  ECHOCARDIOGRAM  NEED TO FIND OUT WHAT SORT OF ANTICOAGULATION PT HAS BEEN ON RECENTLY.       Thank you for your consult. I will follow-up with patient. Please contact us if you have any additional questions.       Dionte Killian MD  Cardiology   [facility] -

## 2017-03-07 NOTE — PROCEDURES
"Tara Anderson is a 57 y.o. female patient.    Temp: 96 °F (35.6 °C) (03/07/17 1230)  Pulse: 87 (03/07/17 1503)  Resp: (!) 22 (03/07/17 1503)  BP: (!) 153/56 (03/07/17 1503)  SpO2: 100 % (03/07/17 1503)  Weight: 103.4 kg (228 lb) (03/07/17 0901)  Height: 5' 6" (167.6 cm) (03/07/17 0901)       Arterial Line  Date/Time: 3/7/2017 3:28 PM  Location procedure was performed: [facility] INTENSIVE CARE UNIT  Performed by: TRINIDAD MORGAN  Authorized by: TRINIDAD MORGAN   Pre-op Diagnosis: cardiac arrest  Post-operative diagnosis: cardiac arrest  Consent Done: Not Needed  Preparation: Patient was prepped and draped in the usual sterile fashion.  Indications: multiple ABGs and hemodynamic monitoring  Location: left radial  Needle gauge: 20  Seldinger technique: Seldinger technique used  Number of attempts: 1  Complications: No  Specimens: No  Implants: No  Post-procedure: dressing applied (secured with steri strips with mastisol)  Post-procedure CMS: unchanged  Patient tolerance: Patient tolerated the procedure well with no immediate complications          Trinidad Morgan  3/7/2017  "

## 2017-03-07 NOTE — ASSESSMENT & PLAN NOTE
Electrical cardioversion by EMS.  Received Amiodarone during resuscitation and on continuous infusion.  Monitoring and correcting electrolytes.  Hemodynamically stable in normal sinus.  She was temporarily in atrial fibrillation an admission.

## 2017-03-07 NOTE — ED PROVIDER NOTES
SCRIBE #1 NOTE: I, Tevin Damon, am scribing for, and in the presence of, Ant Casas MD. I have scribed the entire note.      History      Chief Complaint   Patient presents with    Cardiac Arrest     post cardiac arrest        Review of patient's allergies indicates:   Allergen Reactions    Pcn [penicillins]         HPI   HPI    3/7/2017, 8:53 AM   History obtained from the EMS      History of Present Illness: Tara Anderson is a 57 y.o. female patient with Hx of CHF, DM, and HTN presents to the Emergency Department for sudden cardiac arrest which onset suddenly today. Symptoms are constant and moderate in severity. Sx are exacerbated by nothing and relieved by nothing. EMS states her daughter at home heard her mom fall and when she went to check on her she was unresponsive, CPR was administered immediately via daughter and 911 was called at 0751. EMS states pt was shocked once prior to their arrival via fire department and EMS reports giving pt 1 mg of epi when they arrived. EMS reports giving pt 300 mg amiodarone and pt was intubated after being shocked 2 more times via EMS. EMS reports giving pt 3mg of epi total. EMS states an acute stemi was noticed on the EKG that was done. EMS states pt never had any neurological function and only reports occasional breathing. Unable to obtain further information and ROS secondary to pt being unresponsive.    Arrival mode: EMS    PCP: Primary Doctor No       Past Medical History:  Past Medical History:   Diagnosis Date    CAD, multiple vessel 3/7/2017    Cardiac arrest 3/7/2017    CHF (congestive heart failure)     Diabetes mellitus     History of CVA (cerebrovascular accident) 3/7/2017    History of PTCA 3/7/2017    Hypertension     Ischemic cardiomyopathy 3/7/2017    Tobacco abuse 3/7/2017    Ventricular fibrillation 3/7/2017       Past Surgical History:  history reviewed. Not pertinent.      Family History:  history reviewed. Not  pertinent.    Social History:  Social History     Social History Main Topics    Smoking status: Never Smoker    Smokeless tobacco: Unknown    Alcohol use No    Drug use: No    Sexual activity: No       ROS   Review of Systems   Unable to perform ROS: Patient unresponsive       Physical Exam      Vitals:    03/07/17 0939 03/07/17 0940 03/07/17 0944 03/07/17 0950   BP: (!) 156/80 (!) 149/68 138/73 128/73   BP Location:       Patient Position:       Pulse: 82 83 88 91   Resp: (!) 24 (!) 25 16 17   Temp:       TempSrc:       SpO2: 100% 100% 100% 100%   Weight:       Height:           Physical Exam  Nursing Notes and Vital Signs Reviewed.  Constitutional: Patient is in severe distress. Pt is unresponsive. Well-developed and well-nourished.  Head: Normocephalic.  Eyes: Pupils are equal and non reactive, no corneal reflex appreciated. EOM intact. Conjunctivae are not pale. No scleral icterus.  ENT: Mucous membranes are moist. Oropharynx is clear and symmetric.    Neck: Supple. No JVD. No lymphadenopathy.  Cardiovascular: Regular rate. Regular rhythm. No murmurs, rubs, or gallops. Distal pulses are 2+ and symmetric.  Pulmonary/Chest: Pt is intubated with ET tube in place. Clear to auscultation with bagging. No rales or rhonchi appreciated.  Abdominal: Soft and non-distended. Good bowel sounds.  Musculoskeletal: Moves all extremities. No obvious deformities. No edema. Radial pulses are equal and 2+ bilaterally. DP and PT pulses are equal and 2+ bilaterally.   Skin: Cool to touch and dry.  Neurological:  No preferable movements. Full neuro exam limited due to patient's condition      ED Course    Critical Care  Date/Time: 3/7/2017 9:11 AM  Performed by: VARSHA KHAN  Authorized by: VARSHA KHAN   Direct patient critical care time: 15 minutes  Additional history critical care time: 15 minutes  Ordering / reviewing critical care time: 15 minutes  Documentation critical care time: 10 minutes  Consulting  "other physicians critical care time: 5 minutes  Total critical care time (exclusive of procedural time) : 60 minutes  Critical care time was exclusive of separately billable procedures and treating other patients and teaching time.  Critical care was necessary to treat or prevent imminent or life-threatening deterioration of the following conditions: cardiac failure and respiratory failure.  Critical care was time spent personally by me on the following activities: blood draw for specimens, development of treatment plan with patient or surrogate, discussions with consultants, interpretation of cardiac output measurements, evaluation of patient's response to treatment, examination of patient, obtaining history from patient or surrogate, ordering and performing treatments and interventions, ordering and review of laboratory studies, ordering and review of radiographic studies, pulse oximetry, re-evaluation of patient's condition, review of old charts, ventilator management and vascular access procedures.  Subsequent provider of critical care: I assumed direction of critical care for this patient from another provider of my specialty.    Central Line  Date/Time: 3/7/2017 9:10 AM  Performed by: VARSHA KHAN  Time out: Immediately prior to procedure a "time out" was called to verify the correct patient, procedure, equipment, support staff and site/side marked as required.  Indications: med administration and vascular access  Anesthesia: local infiltration    Anesthesia:  Anesthesia: local infiltration  Local Anesthetic: lidocaine 1% without epinephrine   Preparation: skin prepped with ChloraPrep  Skin prep agent dried: skin prep agent completely dried prior to procedure  Sterile barriers: all five maximum sterile barriers used - cap, mask, sterile gown, sterile gloves, and large sterile sheet  Hand hygiene: hand hygiene performed prior to central venous catheter insertion  Location details: right internal " "jugular  Catheter type: triple lumen  Catheter size: 7 Fr  Catheter Length: 16cm    Ultrasound guidance: yes  Vessel Caliber: medium, patent, compressibility normal  Needle advanced into vessel with real time Ultrasound guidance.  Guidewire confirmed in vessel.  Sterile sheath used.  Manometry: No   Number of attempts: 1  Assessment: placement verified by x-ray and successful placement  Complications: none  Specimens: No  Implants: No  Post-procedure: line sutured  Complications: No        ED Vital Signs:  Vitals:    03/07/17 0850 03/07/17 0855 03/07/17 0901 03/07/17 0904   BP: 137/60 (!) 171/83 (!) 171/81 (!) 179/109   Pulse: 82 86 91 93   Resp: 17 15 16 17   Temp:   (!) 93.8 °F (34.3 °C)    TempSrc:   Axillary    SpO2: 100% 100% 100% 100%   Weight:   103.4 kg (228 lb)    Height:   5' 6" (1.676 m)     03/07/17 0924 03/07/17 0935 03/07/17 0939 03/07/17 0940   BP: (!) 163/92 (!) 163/69 (!) 156/80 (!) 149/68   Pulse: 87 90 82 83   Resp: (!) 26 (!) 30 (!) 24 (!) 25   Temp:       TempSrc:       SpO2: 100% 100% 100% 100%   Weight:       Height:        03/07/17 0944 03/07/17 0950   BP: 138/73 128/73   Pulse: 88 91   Resp: 16 17   Temp:     TempSrc:     SpO2: 100% 100%   Weight:     Height:         Abnormal Lab Results:  Labs Reviewed   CBC W/ AUTO DIFFERENTIAL - Abnormal; Notable for the following:        Result Value    WBC 24.17 (*)     RDW 14.6 (*)     Mono% 1.0 (*)     Platelet Estimate Clumped (*)     All other components within normal limits   TROPONIN I - Abnormal; Notable for the following:     Troponin I 0.083 (*)     All other components within normal limits   COMPREHENSIVE METABOLIC PANEL - Abnormal; Notable for the following:     CO2 11 (*)     Glucose 386 (*)     Albumin 3.0 (*)     AST 98 (*)     ALT 82 (*)     Anion Gap 25 (*)     eGFR if  57 (*)     eGFR if non  50 (*)     All other components within normal limits   GLUCOSE, RANDOM - Abnormal; Notable for the following:     " Glucose 346 (*)     All other components within normal limits    Narrative:     Obtain from central line or arterial line as peripheral  checks may be inaccurate.   LACTIC ACID, PLASMA - Abnormal; Notable for the following:     Lactate (Lactic Acid) >12.0 (*)     All other components within normal limits    Narrative:      LACTIC ACID  critical result(s) called and verbal readback obtained   from JERRI MEANS RN, 03/07/2017 09:44   ISTAT PROCEDURE - Abnormal; Notable for the following:     POC PH 7.124 (*)     POC PCO2 54.8 (*)     POC HCO3 18.0 (*)     POC SATURATED O2 75 (*)     All other components within normal limits   CULTURE, URINE   CULTURE, RESPIRATORY   CULTURE, BLOOD   CULTURE, BLOOD   CULTURE, BLOOD   CULTURE, BLOOD   B-TYPE NATRIURETIC PEPTIDE   PROTIME-INR   CK   URINALYSIS   NEURON SPECIFIC ENOLASE, SERUM   GLUCOSE, RANDOM   GLUCOSE, RANDOM   BASIC METABOLIC PANEL   BASIC METABOLIC PANEL   MAGNESIUM   MAGNESIUM   PHOSPHORUS   PHOSPHORUS   TROPONIN I   GLUCOSE, RANDOM   GLUCOSE, RANDOM   URINALYSIS   TYPE & SCREEN        All Lab Results:  Results for orders placed or performed during the hospital encounter of 03/07/17   Brain natriuretic peptide   Result Value Ref Range    BNP 37 0 - 99 pg/mL   CBC auto differential   Result Value Ref Range    WBC 24.17 (H) 3.90 - 12.70 K/uL    RBC 4.55 4.00 - 5.40 M/uL    Hemoglobin 12.9 12.0 - 16.0 g/dL    Hematocrit 40.0 37.0 - 48.5 %    MCV 88 82 - 98 fL    MCH 28.4 27.0 - 31.0 pg    MCHC 32.3 32.0 - 36.0 %    RDW 14.6 (H) 11.5 - 14.5 %    Platelets 245 150 - 350 K/uL    MPV 11.1 9.2 - 12.9 fL    Lymph # CANCELED 1.0 - 4.8 K/uL    Mono # CANCELED 0.3 - 1.0 K/uL    Eos # CANCELED 0.0 - 0.5 K/uL    Baso # CANCELED 0.00 - 0.20 K/uL    Gran% 49.0 38.0 - 73.0 %    Lymph% 44.0 18.0 - 48.0 %    Mono% 1.0 (L) 4.0 - 15.0 %    Eosinophil% 2.0 0.0 - 8.0 %    Basophil% 0.0 0.0 - 1.9 %    Bands 3.0 %    Metamyelocytes 1.0 %    Platelet Estimate Clumped (A)     Differential  Method Manual    Protime-INR   Result Value Ref Range    Prothrombin Time 11.0 9.0 - 12.5 sec    INR 1.1 0.8 - 1.2   Troponin I   Result Value Ref Range    Troponin I 0.083 (H) 0.000 - 0.026 ng/mL   Comprehensive metabolic panel   Result Value Ref Range    Sodium 140 136 - 145 mmol/L    Potassium 3.9 3.5 - 5.1 mmol/L    Chloride 104 95 - 110 mmol/L    CO2 11 (L) 23 - 29 mmol/L    Glucose 386 (H) 70 - 110 mg/dL    BUN, Bld 15 6 - 20 mg/dL    Creatinine 1.2 0.5 - 1.4 mg/dL    Calcium 9.2 8.7 - 10.5 mg/dL    Total Protein 7.1 6.0 - 8.4 g/dL    Albumin 3.0 (L) 3.5 - 5.2 g/dL    Total Bilirubin 0.3 0.1 - 1.0 mg/dL    Alkaline Phosphatase 122 55 - 135 U/L    AST 98 (H) 10 - 40 U/L    ALT 82 (H) 10 - 44 U/L    Anion Gap 25 (H) 8 - 16 mmol/L    eGFR if African American 57 (A) >60 mL/min/1.73 m^2    eGFR if non African American 50 (A) >60 mL/min/1.73 m^2   CK   Result Value Ref Range     20 - 180 U/L   Glucose, random   Result Value Ref Range    Glucose 346 (H) 70 - 110 mg/dL   Lactic acid, plasma   Result Value Ref Range    Lactate (Lactic Acid) >12.0 (HH) 0.5 - 2.2 mmol/L   Type & Screen   Result Value Ref Range    Group & Rh B POS     Indirect Roya NEG    ISTAT PROCEDURE   Result Value Ref Range    POC PH 7.124 (L) 7.35 - 7.45    POC PCO2 54.8 (H) 35 - 45 mmHg    POC PO2 53 40 - 60 mmHg    POC HCO3 18.0 (L) 24 - 28 mmol/L    POC BE -11 -2 to 2 mmol/L    POC SATURATED O2 75 (L) 95 - 100 %    Rate 22     Sample VENOUS     Allens Test N/A     DelSys Adult Vent     Mode AC/PRVC     Vt 500     PEEP 5     FiO2 100          Imaging Results:  Imaging Results         CT Head Without Contrast (Final result) Result time:  03/07/17 10:07:10    Final result by Jareth Mccarthy MD (03/07/17 10:07:10)    Impression:           No CT evidence of acute intracranial abnormality.  Multifocal remote bilateral infarcts as above.    All CT scans at this facility use dose modulation, iterative reconstruction and/or weight based dosing when  appropriate to reduce radiation dose to as low as reasonably achievable.       Electronically signed by: WELLINGTON GRAHAM MD  Date:     03/07/17  Time:    10:07     Narrative:    Exam:  CT HEAD WITHOUT CONTRAST    Clinical History:  Altered mental status, acute onset.     Technique: Axial CT imaging was performed through the head without intravenous contrast.     Comparison:  None     Findings: Marked encephalomalacia throughout the right cerebral hemisphere with associated volume loss consistent with a remote right MCA infarct.  Smaller areas of encephalomalacia in the left frontal and left occipital lobes as well as the left cerebellar hemisphere also indicate remote left sided infarct.  There is a remote left basal ganglia infarct measuring 10 mm.    No intracranial hemorrhage is identified.   No hydrocephalus, midline shift or mass effect.  The calvarium is intact.   Paranasal sinus disease.  Atheromatous calcifications involve the bilateral cavernous carotid arteries.            X-Ray Chest 1 View (Final result) Result time:  03/07/17 09:40:50    Final result by Abner Murphy MD (03/07/17 09:40:50)    Impression:         Interval placement endotracheal tube and right internal jugular line.    Clear lungs.    Cardiomegaly.      Electronically signed by: ABNER MURPHY MD  Date:     03/07/17  Time:    09:40     Narrative:    Exam: XR CHEST 1 VIEW,    Date:  03/07/17 09:00:05    History: Respiratory failure    Comparison:  01/13/2014    Findings:     Lungs clear.  Cardiac silhouette is enlarged.  There has been interval placement of an endotracheal tube with the tip above the niko, at the level of the clavicles.  Interval placement right internal jugular line identified.  Distal tip overlies the proximal SVC.  Vascular calcifications of aorta noted.               The EKG was ordered, reviewed, and independently interpreted by the ED provider.  Interpretation time: 0852  Rate: 81 BPM  Rhythm: normal  sinus rhythm  Interpretation: QRS is 114. Axis is left. T wave inversion in leads v4, v5, and v6. AT segment is normal . No STEMI.         The Emergency Provider reviewed the vital signs and test results, which are outlined above.    ED Discussion     8:40 AM: Dr. Killian was notified of pt secondary to EMS stating there was an acute stemi noted the EKG en route to ED.    8:43 AM: Pt arrived to ED intubated.    8:53 AM: Dr. Casas discussed the pt's case with Dr. Killian (Cardiology) who recommends heparin drip after CT of the head and Amio drip.    9:04 AM: Dr. Killian is at pt's bedside in the ED.     9:10 AM: Dr. Killian is updating family about pt's condition at this time.    10:10 AM: Discussed case with Karma (Hospital Medicine). Dr. Mitchell agrees with current care and management of pt and accepts admission.   Admitting Service: Hospital medicine   Admitting Physician: Dr. Mitchell  Admit to: ICU    11:35 AM: Attempted to update family on admission status and pt condition, but there was no response when last name was called in the lobby.    ED Medication(s):  Medications   ondansetron injection 4 mg (not administered)   amiodarone 360 mg/200 mL (1.8 mg/mL) infusion (0.5 mg/min Intravenous New Bag 3/7/17 0908)   busPIRone tablet 30 mg (not administered)   acetaminophen oral solution 650 mg (not administered)   magnesium sulfate 2g in water 50mL IVPB (premix) (not administered)   propofol (DIPRIVAN) 10 mg/mL infusion (30 mcg/kg/min × 103.4 kg Intravenous Rate/Dose Change 3/7/17 4850)   lorazepam injection 1 mg (1 mg Intravenous Given 3/7/17 4834)       New Prescriptions    No medications on file             Medical Decision Making    Medical Decision Making:   Clinical Tests:   Lab Tests: Ordered and Reviewed  Radiological Study: Reviewed and Ordered  Medical Tests: Ordered and Reviewed           Scribe Attestation:   Scribe #1: I performed the above scribed service and the documentation accurately describes the  services I performed. I attest to the accuracy of the note.    Attending:   Physician Attestation Statement for Scribe #1: I, Ant Casas MD, personally performed the services described in this documentation, as scribed by Tevin Damon, in my presence, and it is both accurate and complete.          Clinical Impression       ICD-10-CM ICD-9-CM   1. Cardiac arrest I46.9 427.5   2. Respiratory failure J96.90 518.81   3. Altered mental status R41.82 780.97       Disposition:   Disposition: Admitted (ICU)  Condition: Critical         Ant Casas MD  03/07/17 2898

## 2017-03-07 NOTE — SUBJECTIVE & OBJECTIVE
Past Medical History:   Diagnosis Date    CAD, multiple vessel 3/7/2017    Cardiac arrest 3/7/2017    CHF (congestive heart failure)     Diabetes mellitus     History of CVA (cerebrovascular accident) 3/7/2017    History of PTCA 3/7/2017    Hypertension     Ischemic cardiomyopathy 3/7/2017    Tobacco abuse 3/7/2017    Ventricular fibrillation 3/7/2017       No past surgical history on file.    Review of patient's allergies indicates:   Allergen Reactions    Pcn [penicillins]        No current facility-administered medications on file prior to encounter.      Current Outpatient Prescriptions on File Prior to Encounter   Medication Sig    amitriptyline (ELAVIL) 25 MG tablet Take 25 mg by mouth nightly as needed.    amlodipine (NORVASC) 10 MG tablet Take 10 mg by mouth once daily.    atorvastatin (LIPITOR) 40 MG tablet Take 40 mg by mouth once daily.    ciprofloxacin (CIPRO) 250 MG tablet Take 250 mg by mouth 2 (two) times daily.    clindamycin (CLEOCIN) 300 MG capsule Take 300 mg by mouth 3 (three) times daily.    clopidogrel (PLAVIX) 75 mg tablet Take 75 mg by mouth once daily.    dipyridamole-aspirin 200-25 mg (AGGRENOX) 200-25 mg CM12 Take 1 capsule by mouth 2 (two) times daily.    furosemide (LASIX) 40 MG tablet Take 40 mg by mouth 2 (two) times daily.    gabapentin (NEURONTIN) 100 MG capsule Take 100 mg by mouth 3 (three) times daily.    hydrALAZINE (APRESOLINE) 50 MG tablet Take 50 mg by mouth 3 (three) times daily.    hydrochlorothiazide (HYDRODIURIL) 12.5 MG Tab Take 25 mg by mouth once daily.     hydrOXYzine (ATARAX) 25 MG tablet Take 25 mg by mouth 4 (four) times daily.    insulin glargine (LANTUS) 100 unit/mL injection Inject into the skin every evening.    isosorbide mononitrate (IMDUR) 30 MG 24 hr tablet Take 30 mg by mouth once daily.    lisinopril 10 MG tablet Take 40 mg by mouth once daily.     metoprolol tartrate (LOPRESSOR) 25 MG tablet Take 50 mg by mouth 2 (two) times  daily.     predniSONE (DELTASONE) 20 MG tablet Take 40 mg by mouth once daily.    warfarin (COUMADIN) 5 MG tablet Take 5 mg by mouth once daily.     Family History     None        Social History Main Topics    Smoking status: Never Smoker    Smokeless tobacco: Not on file    Alcohol use No    Drug use: No    Sexual activity: No     Review of Systems   Unable to perform ROS: Intubated     Objective:     Vital Signs (Most Recent):  Temp: 96 °F (35.6 °C) (03/07/17 1230)  Pulse: 87 (03/07/17 1503)  Resp: (!) 22 (03/07/17 1503)  BP: (!) 153/56 (03/07/17 1503)  SpO2: 100 % (03/07/17 1503) Vital Signs (24h Range):  Temp:  [93.3 °F (34.1 °C)-96.4 °F (35.8 °C)] 96 °F (35.6 °C)  Pulse:  [] 87  Resp:  [15-30] 22  SpO2:  [96 %-100 %] 100 %  BP: ()/() 153/56     Weight: 103.4 kg (228 lb)  Body mass index is 36.8 kg/(m^2).    Physical Exam   Constitutional: She appears well-developed and well-nourished. No distress.   HENT:   Head: Normocephalic and atraumatic.   Mouth/Throat: Oropharynx is clear and moist.   Eyes: Conjunctivae are normal. Pupils are equal, round, and reactive to light.   Neck: Neck supple. No JVD present. No thyromegaly present.   Cardiovascular: Exam reveals no gallop and no friction rub.    No murmur heard.  Tachycardia with an irregularly irregular rhythm.   Pulmonary/Chest: Effort normal and breath sounds normal. She has no wheezes. She has no rales.   Abdominal: Soft. Bowel sounds are normal. She exhibits no distension. There is no tenderness. There is no rebound and no guarding.   Musculoskeletal: Normal range of motion. She exhibits no edema or deformity.   Lymphadenopathy:     She has no cervical adenopathy.   Neurological: She has normal reflexes.   Intubated and sedated   Skin: No rash noted.   Cool.  Lower legs with rough leathery skin with darkening consistent with chronic venous stasis.   Nursing note and vitals reviewed.       Significant Labs:   CBC:   Recent Labs  Lab  03/07/17  0855 03/07/17  1047   WBC 24.17*  --    HGB 12.9  --    HCT 40.0 44     --      CMP:   Recent Labs  Lab 03/07/17  0855 03/07/17  0922 03/07/17  1212     --  139   K 3.9  --  2.9*     --  103   CO2 11*  --  17*   * 346* 346*  346*  346*  346*   BUN 15  --  19   CREATININE 1.2  --  1.5*   CALCIUM 9.2  --  9.5   PROT 7.1  --   --    ALBUMIN 3.0*  --   --    BILITOT 0.3  --   --    ALKPHOS 122  --   --    AST 98*  --   --    ALT 82*  --   --    ANIONGAP 25*  --  19*   EGFRNONAA 50*  --  38*     Cardiac Markers:   Recent Labs  Lab 03/07/17  0855   BNP 37       Significant Imaging: I have reviewed all pertinent imaging results/findings within the past 24 hours.

## 2017-03-07 NOTE — ASSESSMENT & PLAN NOTE
Troponin elevated on admission and expected to rise.  Continue to trend.  A new MI could have been the inciting cause of arrest.

## 2017-03-07 NOTE — CONSULTS
Inpatient consult to Pulmonology  Consult performed by: TRINIDAD MORGAN  Consult ordered by: VARSHA KHAN  Reason for consult: vent management        Critical Care Consult      Chief Complaint:  Coma, Respiratory Failure post Sudden Cardiac Arrest    HPI:    Tara Anderson is a 57 y.o. female with a PMH of multi vessel CAD, CHF, DM, CVA, HTN, cardiomyopathy, and tobacco abuse who presented to [facility] ED via EMS called when daughter heard pt fall, found unresponsive, initiated CPR; total CPR time estimated about 20m with multiple shocks, epi, and amiodarone given. She was intubated by EMS prior to arrival and on arrival to ED remained unresponsive with ambu respirations  ED evaluation revealed leukocytosis, high anion gap acidosis, elevated glucose, and lactate > 12  Targeted temperature therapy was initiated in ED and she is admitted to ICU with OETT, mechanical ventilation, infusions of amiodarone, dopamine, propofol, vecuronium, and heparin; with temp at goal 93 via BBspace cooling system    PMHx:      Past Medical History:   Diagnosis Date    CAD, multiple vessel 3/7/2017    Cardiac arrest 3/7/2017    CHF (congestive heart failure)     Diabetes mellitus     History of CVA (cerebrovascular accident) 3/7/2017    History of PTCA 3/7/2017    Hypertension     Ischemic cardiomyopathy 3/7/2017    Tobacco abuse 3/7/2017    Ventricular fibrillation 3/7/2017        PMSx:      No past surgical history on file.     Social Hx:      Social History     Social History    Marital status: Single     Spouse name: N/A    Number of children: N/A    Years of education: N/A     Occupational History    Not on file.     Social History Main Topics    Smoking status: Never Smoker    Smokeless tobacco: Not on file    Alcohol use No    Drug use: No    Sexual activity: No     Other Topics Concern    Not on file     Social History Narrative        Family Hx:      No family history on file.     Allergies:       Review of patient's allergies indicates:   Allergen Reactions    Pcn [penicillins]        Medications:    Home medications prior to admission reviewed.  Current Facility-Administered Medications   Medication    amiodarone 360 mg/200 mL (1.8 mg/mL) infusion    chlorhexidine 0.12 % solution 15 mL    famotidine (PF) 20 mg/2 mL injection 20 mg    heparin 25,000 units in dextrose 5% 250 mL (100 units/mL) bolus from bag; PRN BOLUS    heparin 25,000 units in dextrose 5% 250 mL (100 units/mL) bolus from bag; PRN BOLUS    heparin 25,000 units in dextrose 5% 250 mL (100 units/mL) infusion; FEMALE    magnesium sulfate 2g in water 50mL IVPB (premix)    norepinephrine 4 mg in dextrose 5% 250 mL infusion (premix) (titrating)    propofol (DIPRIVAN) 10 mg/mL infusion    propofol (DIPRIVAN) 10 mg/mL infusion       ROS:  Unable s/t coma, OETT, mechanical ventilation    Vital Signs (Most Recent)  Temp: 96 °F (35.6 °C) (03/07/17 1230)  Pulse: 87 (03/07/17 1503)  Resp: (!) 22 (03/07/17 1503)  BP: (!) 153/56 (03/07/17 1503)  SpO2: 100 % (03/07/17 1503)    Vital Signs Range (Last 24H):  Temp:  [93.3 °F (34.1 °C)-96.4 °F (35.8 °C)]   Pulse:  []   Resp:  [15-30]   BP: ()/()   SpO2:  [96 %-100 %]     I & O (Last 24H):No intake or output data in the 24 hours ending 03/07/17 1556  Ventilator Data (Last 24H):     Vent Mode: A/C  Oxygen Concentration (%):  [] 50  Resp Rate Total:  [22 br/min-28 br/min] 22 br/min  Vt Set:  [500 mL] 500 mL  PEEP/CPAP:  [0 cmH20-5 cmH20] 5 cmH20  Pressure Support:  [0 cmH20] 0 cmH20  Mean Airway Pressure:  [9.7 sqA81-64 cmH20] 13 cmH20     Physical Exam   Constitutional: She is sedated, chemically paralyzed and intubated.   Obese   HENT:   Head: Normocephalic and atraumatic.   Eyes: Conjunctivae are normal. Pupils are equal, round, and reactive to light.   Neck: No JVD present. No tracheal deviation present.   Cardiovascular: Regular rhythm.   No extrasystoles are present.  Bradycardia present.    No murmur heard.  Pulses:       Femoral pulses are 1+ on the right side, and 1+ on the left side.       Dorsalis pedis pulses are 1+ on the right side, and 1+ on the left side.   Pulmonary/Chest: She is intubated. She has decreased breath sounds. She has no wheezes. She has no rales.   Abdominal: Soft. She exhibits no distension. Bowel sounds are absent.   Neurological: GCS eye subscore is 1. GCS verbal subscore is 1. GCS motor subscore is 1.   Skin: Skin is dry.   cool       Laboratory (Last 24H):  CBC:    Recent Labs  Lab 03/07/17  0855 03/07/17  1047   WBC 24.17*  --    HGB 12.9  --    HCT 40.0 44     --      CMP:    Recent Labs  Lab 03/07/17  0855 03/07/17  1212   CALCIUM 9.2 9.5   ALBUMIN 3.0*  --    PROT 7.1  --     139   K 3.9 2.9*   CO2 11* 17*    103   BUN 15 19   CREATININE 1.2 1.5*   ALKPHOS 122  --    ALT 82*  --    AST 98*  --    BILITOT 0.3  --        Labs within the past 24 hours have been reviewed.  ABG    Recent Labs  Lab 03/07/17  1047   PH 7.332*   PO2 402*   PCO2 42.3   HCO3 22.4*   BE -3         Chest X-Ray:   Film and report reviewed: no acute pulmonary findings    ASSESSMENT/PLAN:     Problem   Cardiac Arrest   [place] Coma Scale Total Score 3-8   Acute Respiratory Failure With Hypercapnia   Ventricular Fibrillation   Type 2 Diabetes Mellitus With Hyperglycemia   Ischemic Cardiomyopathy   Tobacco Abuse     1. Neuro: targeted temperature management for neuro protection; sedation until fully recovered from paralytic  2. Pulmonary: full vent support; VAP prophylaxis  3. Cardiac: monitor hemodynamics; continue amiodarone infusion, levophed if needed to keep MAP > 65  4. Renal: accurate I/O; monitor creatinine  5. Infectious Disease: suspect reactive leukocytosis; sepsis surveillance  6. Hematology/Oncology: monitor PTT and adjust per nomogram for therapeutic level and prevention of insulin toxicity  7. Endocrine: SSI prn with monitoring for glucose  control and prevention of insulin toxicity  8. Fluids/Electrolytes/Nutrition/GI: IV hydration; monitor and replace potassium and mag prior to rewarming  9. Musculoskeletal: ROM  10. Pain Management: prn analgeis  11. Discharge and Palliative Care: critically ill with unclear neuro prognosis until post rewarming assessment    Preventive Measures:   Nutrition: Goal: Tolerate oral diet and meet caloric needs  Stress Ulcer: Pepcid  DVT: heparin infusion  BB: contraindicated with bradycardia  Head of Bed/Reposition: Elevate HOB and turn Q1-2 hours    Physical Therapy: consult once stable.     Sedation Interruption: post re-warming  Vent Day: 1  OG Day: 1  Central Line Day: 1  Arterial Line Day: 1  Perez Day: 1  Pneumonia Vaccine: status unknown  Flu Vaccine: status unknown  Surrogate Decision Maker: children  Code Status: full    Counseling/Consultation:I have discussed the care of this patient in detail with the nursing staff and Dr. Benoit and Dr Mitchell.     Critical Care Time 76 minutes  Critical secondary to coma and respiratory failure post sudden cardiac arrest  Critical care was time spent personally by me on the following activities: development of treatment plan with patient or surrogate and bedside caregivers, discussions with consultants, evaluation of patient's response to treatment, examination of patient, ordering and performing treatments and interventions, ordering and review of laboratory studies, ordering and review of radiographic studies, pulse oximetry, and re-evaluation of patient's condition.  This critical care time did not overlap with that of any other provider and does not include time spent in separately billable procedures.    Thank you Dr. Gonzalez for this consult and the pleasure of evaluating and caring for this patient    Lorna Peguero [facility]  [facility]

## 2017-03-07 NOTE — ASSESSMENT & PLAN NOTE
Witnessed arrest with initiation of CPR without delay by family.  Patient was in ventricular fibrillation when EMS arrived and she received DC cardioversion and ACLS.  Hemodynamically stable but unresponsive upon arrival at the ED.  Therapeutic hypothermia initiated int he ER 07 March approximately 10 am.  No known recent illness or medical problems that may have precipitated arrest.  Her anti-coagulation was recently adjusted but no report of being sub-therapeutic in that process.

## 2017-03-07 NOTE — H&P
[facility] Hospital Medicine  History & Physical    Patient Name: Tara Anderosn  MRN: [id number]  Admission Date: 3/7/2017  Attending Physician: Sagar Mitchell MD   Primary Care Provider: Primary Doctor No         Patient information was obtained from EMS personnel, past medical records and ER records.     Subjective:     Principal Problem:Cardiac arrest    Chief Complaint:   Chief Complaint   Patient presents with    Cardiac Arrest     witnessed post cardiac arrest occured at 0759 and CPR was initiated, Pt shocked X4, amiodarone 300 mg, Narcan and 3 rounds of epi given PTA.         HPI: Witnessed arrest at home with family at 0800.  Her daughter started CPR and EMS arrived shortly after.  When EMS took over her rhythm was Ventricular fibrillation and she was shocked twice.  By ACLS protocols she received Amiodarone 300 mg and three rounds of Epinephrine before recovery of spontaneous circulation.  She was intubated and sedated with propofol and started on a heparin drip.  In the emergency room she was started on the therapeutic cooling protocol.  Limited additional history but her son added that her anti-coagulation had recently been changed.  [place] and has been establishing with new doctors.    Past Medical History:   Diagnosis Date    CAD, multiple vessel 3/7/2017    Cardiac arrest 3/7/2017    CHF (congestive heart failure)     Diabetes mellitus     History of CVA (cerebrovascular accident) 3/7/2017    History of PTCA 3/7/2017    Hypertension     Ischemic cardiomyopathy 3/7/2017    Tobacco abuse 3/7/2017    Ventricular fibrillation 3/7/2017       No past surgical history on file.    Review of patient's allergies indicates:   Allergen Reactions    Pcn [penicillins]        No current facility-administered medications on file prior to encounter.      Current Outpatient Prescriptions on File Prior to Encounter   Medication Sig    amitriptyline (ELAVIL)  25 MG tablet Take 25 mg by mouth nightly as needed.    amlodipine (NORVASC) 10 MG tablet Take 10 mg by mouth once daily.    atorvastatin (LIPITOR) 40 MG tablet Take 40 mg by mouth once daily.    ciprofloxacin (CIPRO) 250 MG tablet Take 250 mg by mouth 2 (two) times daily.    clindamycin (CLEOCIN) 300 MG capsule Take 300 mg by mouth 3 (three) times daily.    clopidogrel (PLAVIX) 75 mg tablet Take 75 mg by mouth once daily.    dipyridamole-aspirin 200-25 mg (AGGRENOX) 200-25 mg CM12 Take 1 capsule by mouth 2 (two) times daily.    furosemide (LASIX) 40 MG tablet Take 40 mg by mouth 2 (two) times daily.    gabapentin (NEURONTIN) 100 MG capsule Take 100 mg by mouth 3 (three) times daily.    hydrALAZINE (APRESOLINE) 50 MG tablet Take 50 mg by mouth 3 (three) times daily.    hydrochlorothiazide (HYDRODIURIL) 12.5 MG Tab Take 25 mg by mouth once daily.     hydrOXYzine (ATARAX) 25 MG tablet Take 25 mg by mouth 4 (four) times daily.    insulin glargine (LANTUS) 100 unit/mL injection Inject into the skin every evening.    isosorbide mononitrate (IMDUR) 30 MG 24 hr tablet Take 30 mg by mouth once daily.    lisinopril 10 MG tablet Take 40 mg by mouth once daily.     metoprolol tartrate (LOPRESSOR) 25 MG tablet Take 50 mg by mouth 2 (two) times daily.     predniSONE (DELTASONE) 20 MG tablet Take 40 mg by mouth once daily.    warfarin (COUMADIN) 5 MG tablet Take 5 mg by mouth once daily.     Family History     None        Social History Main Topics    Smoking status: Never Smoker    Smokeless tobacco: Not on file    Alcohol use No    Drug use: No    Sexual activity: No     Review of Systems   Unable to perform ROS: Intubated     Objective:     Vital Signs (Most Recent):  Temp: 96 °F (35.6 °C) (03/07/17 1230)  Pulse: 87 (03/07/17 1503)  Resp: (!) 22 (03/07/17 1503)  BP: (!) 153/56 (03/07/17 1503)  SpO2: 100 % (03/07/17 1503) Vital Signs (24h Range):  Temp:  [93.3 °F (34.1 °C)-96.4 °F (35.8 °C)] 96 °F (35.6  °C)  Pulse:  [] 87  Resp:  [15-30] 22  SpO2:  [96 %-100 %] 100 %  BP: ()/() 153/56     Weight: 103.4 kg (228 lb)  Body mass index is 36.8 kg/(m^2).    Physical Exam   Constitutional: She appears well-developed and well-nourished. No distress.   HENT:   Head: Normocephalic and atraumatic.   Mouth/Throat: Oropharynx is clear and moist.   Eyes: Conjunctivae are normal. Pupils are equal, round, and reactive to light.   Neck: Neck supple. No JVD present. No thyromegaly present.   Cardiovascular: Exam reveals no gallop and no friction rub.    No murmur heard.  Tachycardia with an irregularly irregular rhythm.   Pulmonary/Chest: Effort normal and breath sounds normal. She has no wheezes. She has no rales.   Abdominal: Soft. Bowel sounds are normal. She exhibits no distension. There is no tenderness. There is no rebound and no guarding.   Musculoskeletal: Normal range of motion. She exhibits no edema or deformity.   Lymphadenopathy:     She has no cervical adenopathy.   Neurological: She has normal reflexes.   Intubated and sedated   Skin: No rash noted.   Cool.  Lower legs with rough leathery skin with darkening consistent with chronic venous stasis.   Nursing note and vitals reviewed.       Significant Labs:   CBC:   Recent Labs  Lab 03/07/17  0855 03/07/17  1047   WBC 24.17*  --    HGB 12.9  --    HCT 40.0 44     --      CMP:   Recent Labs  Lab 03/07/17  0855 03/07/17  0922 03/07/17  1212     --  139   K 3.9  --  2.9*     --  103   CO2 11*  --  17*   * 346* 346*  346*  346*  346*   BUN 15  --  19   CREATININE 1.2  --  1.5*   CALCIUM 9.2  --  9.5   PROT 7.1  --   --    ALBUMIN 3.0*  --   --    BILITOT 0.3  --   --    ALKPHOS 122  --   --    AST 98*  --   --    ALT 82*  --   --    ANIONGAP 25*  --  19*   EGFRNONAA 50*  --  38*     Cardiac Markers:   Recent Labs  Lab 03/07/17  0855   BNP 37       Significant Imaging: I have reviewed all pertinent imaging results/findings within  the past 24 hours.    Assessment/Plan:     * Cardiac arrest  Witnessed arrest with initiation of CPR without delay by family.  Patient was in ventricular fibrillation when EMS arrived and she received DC cardioversion and ACLS.  Hemodynamically stable but unresponsive upon arrival at the ED.  Therapeutic hypothermia initiated int he ER 07 March approximately 10 am.  No known recent illness or medical problems that may have precipitated arrest.  Her anti-coagulation was recently adjusted but no report of being sub-therapeutic in that process.    Ventricular fibrillation  Electrical cardioversion by EMS.  Received Amiodarone during resuscitation and on continuous infusion.  Monitoring and correcting electrolytes.  Hemodynamically stable in normal sinus.  She was temporarily in atrial fibrillation an admission.    CAD, multiple vessel  Troponin elevated on admission and expected to rise.  Continue to trend.  A new MI could have been the inciting cause of arrest.    VTE Risk Mitigation     None        Sagar Mitchell MD  Department of Hospital Medicine   [facility]    Critical care time:  30 minutes

## 2017-03-08 PROBLEM — R13.10 SWALLOWING DIFFICULTY: Status: ACTIVE | Noted: 2017-01-01

## 2017-03-08 PROBLEM — E87.20 METABOLIC ACIDOSIS: Status: ACTIVE | Noted: 2017-01-01

## 2017-03-08 NOTE — PLAN OF CARE
Problem: Patient Care Overview  Goal: Plan of Care Review  Outcome: Ongoing (interventions implemented as appropriate)  Unable to educate d/t sedation and intubation - no family available this shift either.  Remains on hypothermia with arctic sun at goal temp.  Sedation was lightened - never aroused enough to follow commands.  Minimal response to painful stimuli - breathes over vent occasionally.  Hemodynamically stable.  Electrolytes replaced as needed.  Urine output wnl.  Heart with RRR.      Problem: Infection, Risk/Actual (Adult)  Goal: Identify Related Risk Factors and Signs and Symptoms  Related risk factors and signs and symptoms are identified upon initiation of Human Response Clinical Practice Guideline (CPG)   Outcome: Ongoing (interventions implemented as appropriate)  On atibx.

## 2017-03-08 NOTE — ASSESSMENT & PLAN NOTE
Nutrition Problem:   Swallowing difficulty    Etiology/Related to:   Mechanical ventilation    As evidenced by:  Ventilator support and sedation with inability to consume oral diet.    Treatment Strategy:   1. Consider nutrition support until extubated.    Nutrition Diagnosis Status:   New

## 2017-03-08 NOTE — NURSING
PTT=30; per heparin nomogram gave bolus of 35 units/kg and increased rate by 2 units from 16 units to 18 units.

## 2017-03-08 NOTE — PROGRESS NOTES
PTT result called from lab at > 150 from blood drawn at 1604.  Previous RN had misread lab work and adjusted Heparin gtt per old result from 2014 (can't speak for RN, but that is what it appears happened).  Will redraw PTT now to verify, in mean time will hold heparin gtt and send another result in 1 hr and adjust drip accordingly.  No s/s of active bleeding noted.  MD Kraft notified and ordered above lab work and to continue with nomogram after holding gtt for 1 hr.    Lab resulted time of 1604 for APTT was not resulted or called to RN till 2005.

## 2017-03-08 NOTE — PLAN OF CARE
Problem: Patient Care Overview  Goal: Plan of Care Review  Outcome: Ongoing (interventions implemented as appropriate)  Patient remains on ventilator, decreased fio2 to 30% after critical ABG. Will continue to monitor.

## 2017-03-08 NOTE — PLAN OF CARE
Problem: Patient Care Overview  Goal: Plan of Care Review  Outcome: Ongoing (interventions implemented as appropriate)  Recommendation/Intervention: 1. Once medically able consider tubefeed for nutrition support until extubated : Peptamen Bariatric (VHP) at 45ml/hr (1080 calories, 100g protein) 2. Cardiac Diabetic Diet once extubated  Goals: Nutrition Support to meet needs until extubated  Nutrition Goal Status: new  Communication of RD Recs: discussed on rounds

## 2017-03-08 NOTE — PROGRESS NOTES
Care assumed from Hermes rn. Pt sedated/intubated and hypothermic protocol in place. VSS. Safety maintained. Chart and labs noted.

## 2017-03-08 NOTE — PROGRESS NOTES
Progress Note  Critical Care    Admit Date: 3/7/2017   LOS: 1 day     Follow-up For: Coma and Respiratory Failure post Cardiac Arrest    Interval History: remains intubated on mechanical ventilation and induced hypothermia protocol   On propofol vent sedation; SAT this am with myoclonic jerks to head and upper extremities   At goal temp; rewarming to begin around 1430   Remains on amiodarone infusion with no arrhythmias since admission     SUBJECTIVE:   ROS:  Unable s/t OETT on vent    Continuous Infusions:   amiodarone 0.5 mg/min (03/08/17 0600)    custom IV infusion builder 125 mL/hr at 03/08/17 0858    heparin (porcine) in D5W 11.025 Units/kg/hr (03/08/17 0600)    norepinephrine bitartrate-D5W      propofol 5 mcg/kg/min (03/08/17 1031)     Review of patient's allergies indicates:   Allergen Reactions    Pcn [penicillins]        OBJECTIVE:     Vital Signs (Most Recent)  Temp: (!) 90.9 °F (32.7 °C) (03/08/17 0500)  Pulse: (!) 47 (03/08/17 0900)  Resp: (!) 22 (03/08/17 0900)  BP: (!) 88/65 (03/08/17 0900)  SpO2: 100 % (03/08/17 0900)    Vital Signs Range (Last 24H):  Temp:  [90.9 °F (32.7 °C)-96.4 °F (35.8 °C)]   Pulse:  []   Resp:  [18-26]   BP: ()/()   SpO2:  [98 %-100 %]     I & O (Last 24H):  Intake/Output Summary (Last 24 hours) at 03/08/17 1032  Last data filed at 03/08/17 0600   Gross per 24 hour   Intake          1088.79 ml   Output             1138 ml   Net           -49.21 ml       Ventilator Data (Last 24H):     Vent Mode: A/C  Oxygen Concentration (%):  [30-50] 30  Resp Rate Total:  [22 br/min-27 br/min] 22 br/min  Vt Set:  [450 mL-500 mL] 450 mL  PEEP/CPAP:  [5 cmH20] 5 cmH20  Pressure Support:  [0 cmH20] 0 cmH20  Mean Airway Pressure:  [12 waP74-95 cmH20] 13 cmH20       Physical Exam   Constitutional: She is sedated and intubated.   HENT:   Head: Normocephalic.   Eyes: Conjunctivae are normal.   Pupils equal and sluggishly reactive to light bilateraly   Neck: No JVD present. No  tracheal deviation present.   Cardiovascular: Normal rate and regular rhythm.   No extrasystoles are present.   No murmur heard.  Pulses:       Radial pulses are 1+ on the right side, and 1+ on the left side.        Dorsalis pedis pulses are 1+ on the right side, and 1+ on the left side.   Pulmonary/Chest: She is intubated. She has decreased breath sounds. She has no wheezes. She has no rales.   Vent assisted respirations   Abdominal: Soft. She exhibits no distension. Bowel sounds are absent.   Musculoskeletal: She exhibits edema (mild non pitting LE).   Neurological: She is unresponsive.   Skin: Skin is dry.   Arctic sun pads in place maintaining hyothermia, skin cool       Laboratory (Last 24H):  CBC:    Recent Labs  Lab 03/08/17  0405   WBC 24.32*   HGB 14.1   HCT 41.5        CMP:    Recent Labs  Lab 03/08/17  0801   CALCIUM 9.5      K 4.0   CO2 13*      BUN 22*   CREATININE 1.3       Labs within the past 24 hours have been reviewed.  ABG    Recent Labs  Lab 03/08/17  0434   PH 7.193*   PO2 192*   PCO2 38.9   HCO3 15.0*   BE -13         Chest X-Ray:  No new film this am    ASSESSMENT/PLAN:     Problem   [place] Coma Scale Total Score 3-8   Acute Respiratory Failure With Hypercapnia   Metabolic Acidosis   Ventricular Fibrillation   Type 2 Diabetes Mellitus With Hyperglycemia   Ischemic Cardiomyopathy   Cardiac Arrest   Tobacco Abuse       1. Neuro: neuro consult, EEG post re-warming  2. Pulmonary: full vent support; VAP prophylaxis; add bronchodilator  3. Cardiac: monitor hemodynamics; amiodarone infusion per cardiology  4. Renal: accurate I/O; monitor creatinine trend  5. Infectious Disease: suspect reactive leukocytosis; follow lactate, wbc trend  6. Hematology/Oncology: monitor for bleeding  7. Endocrine: add long acting insulin plus SSI prn with monitoring for glucose control and prevention of insulin toxicity  8. Fluids/Electrolytes/Nutrition/GI: add bicarb infusion; monitor electrolytes;  enteral feeding post rewarming  9. Musculoskeletal: ROM  10. Pain Management: analgesia available  11. Discharge and Palliative Care: anticipate home on discharge    Preventive Measures:   Nutrition: Goal: Tolerate oral diet and meet caloric needs  Stress Ulcer: Pepcid  DVT: heparin infusion  Head of Bed/Reposition: Elevate HOB and turn Q1-2 hours    Physical Therapy: consult once stable    Sedation Interruption: post re-warming   Vent Day: 2  OG Day: 2  Central Line Day: 2  Arterial Line Day: 2  Perez Day: 2  IVAB Day: 2  Code Status: DNR per discussion with son today    Counseling/Consultation: I have discussed the care of this patient in detail with nursing staff and Dr. Benoit and Dr Mitchell. Status and plan of care discussed with team on multidisciplinary rounds.     Critical Care Time 70 minutes  Critical secondary to coma and respiratory failure post cardiac arrest  Critical care was time spent personally by me on the following activities: development of treatment plan with patient or surrogate and bedside caregivers, discussions with consultants, evaluation of patient's response to treatment, examination of patient, ordering and performing treatments and interventions, ordering and review of laboratory studies, ordering and review of radiographic studies, pulse oximetry, re-evaluation of patient's condition.  This critical care time did not overlap with that of any other provider.    Lorna Peguero [facility]  [facility]

## 2017-03-08 NOTE — PLAN OF CARE
Due to medical status unable to determine optimal discharge placement at this time. CM will continue to assess.     03/08/17 0743   Discharge Assessment   Assessment Type Discharge Planning Assessment   Confirmed/corrected address and phone number on facesheet? Yes   Assessment information obtained from? Medical Record   Expected Length of Stay (days) (unable to determine)   Type of Healthcare Directive Received (none on file)   If Healthcare Directive is received, is it scanned into Epic? no (comment)   Prior to hospitilization cognitive status: Alert/Oriented   Prior to hospitalization functional status: Independent   Current cognitive status: Coma/Sedated/Intubated   Current Functional Status: Completely Dependent   Arrived From admitted as an inpatient;home or self-care   Lives With child(don), adult   Able to Return to Prior Arrangements unable to determine at this time (comments)   Is patient able to care for self after discharge? Unable to determine at this time (comments)   Readmission Within The Last 30 Days no previous admission in last 30 days   Patient currently being followed by outpatient case management? No   Patient currently receives home health services? No   Patient currently receives private duty nursing? N/A   Equipment Currently Used at Home none   Do you have any problems affording any of your prescribed medications? TBD   Discharge Plan A Other   Patient/Family In Agreement With Plan unable to assess

## 2017-03-08 NOTE — PROGRESS NOTES
Propofol stopped and pt monitored for seizures/ect. Pt had myoclonic jerking,HTN with SBP >200. RR 32. Demerol 25mg iv given and cont to monitor. Restarted propofol for jerking/seizure like activity per Lorna Peguero new orders. titrating for Rass -1

## 2017-03-08 NOTE — CONSULTS
[facility] -   Adult Nutrition  Consult Note    SUMMARY     Recommendations  Recommendation/Intervention: 1. Once medically able consider tubefeed for nutrition support until extubated : Peptamen Bariatric (VHP) at 45ml/hr (1080 calories, 100g protein)     2. Cardiac Diabetic Diet once extubated  Goals: Nutrition Support to meet needs until extubated  Nutrition Goal Status: new  Communication of RD Recs: discussed on rounds    Nutrition Discharge Plan  Too soon to determine    Reason for Assessment  Reason for Assessment: nurse/nurse practitioner consult (intubated)  Diagnosis:  (Cardiac Arrest, VFib)  Relevent Medical History: CAD, CHF, DM, CVA, HTN, Vfib, Cardiomyopathy, see H&P   Interdisciplinary Rounds: attended  General Information Comments: Patient is ventilated and sedated with hypothermia protocol in place.    Nutrition Prescription Ordered  Current Diet Order: NPO    Evaluation of Received Nutrients/Fluid Intake  Other Calories (kcal): 327 (propofol at 12.4ml/hr)     Nutrition Risk Screen  Nutrition Risk Screen: other (see comments) (intubated)    Nutrition/Diet History  Typical Food/Fluid Intake: unable to obtain    Labs/Tests/Procedures/Meds  Pertinent Labs Reviewed: reviewed  Pertinent Labs Comments: BUN 22, Cr 1.3, GFR 52, Gluc 241, Phos 4.7  Pertinent Medications Reviewed: reviewed    Physical Findings  Overall Physical Appearance: on ventilator support  Oral/Mouth Cavity: tooth/teeth missing  Skin:  ([place] 14)    Anthropometrics  Height (inches): 65.98 in  Weight Method: Bed Scale  Weight (kg): 103.4 kg  Ideal Body Weight (IBW), Female: 129.9 lb  % Ideal Body Weight, Female (lb): 175.49 lb  BMI (kg/m2): 36.81  BMI Grade: 35 - 39.9 - obesity - grade II    Estimated/Assessed Needs  Weight Used For Calorie Calculations: 103.4 kg (227 lb 15.3 oz)   Height (cm): 167.6 cm  Energy Need Method: [place] (1696 calories)  Weight Used For Protein Calculations: 59 kg (130 lb 1.1 oz) (IBW used  due to ciritcal care obesity)  2.0 gm Protein (gm): 118.25  Fluid Need Method: RDA Method (1ml/ashleigh or as needed)    Monitor and Evaluation  Food and Nutrient Intake: energy intake  Food and Nutrient Adminstration: diet order, enteral and parenteral nutrition administration  Anthropometric Measurements: weight  Biochemical Data, Medical Tests and Procedures: electrolyte and renal panel, glucose/endocrine profile    Nutrition Risk  Level of Risk:  (2x weekly)    Nutrition Follow-Up  RD Follow-up?: Yes    Assessment and Plan  Swallowing difficulty  Nutrition Problem:   Swallowing difficulty    Etiology/Related to:   Mechanical ventilation    As evidenced by:  Ventilator support and sedation with inability to consume oral diet.    Treatment Strategy:   1. Consider nutrition support until extubated.    Nutrition Diagnosis Status:   New

## 2017-03-08 NOTE — PROGRESS NOTES
Cardiology Progress Note        SUBJECTIVE:   Patient admitted to ICU yesterday following V-fib arrest. CPR performed approximately for 20 minutes and defib x 4. Patient showed signs of anoxic brain injury in the ED and had decreased GCS. Seh has been placed on hypothermia protocol. Has not started rewarming phase yet. She is on Heparin gtt after having + troponin and peak of 4.5. She is on Amio gtt for V-fib. Remains intubated and sedated. Lactic acid improved on morning labs. BP stable. Is bradycardiac, but stable. 2D echo shows normal LVF 60%.     OBJECTIVE:     Vital Signs (Most Recent)  Temp: (!) 93.5 °F (34.2 °C) (03/08/17 1300)  Pulse: 68 (03/08/17 1330)  Resp: (!) 22 (03/08/17 1330)  BP: (!) 147/87 (03/08/17 1315)  SpO2: 100 % (03/08/17 1330)    Vital Signs Range (Last 24H):  Temp:  [90.9 °F (32.7 °C)-93.6 °F (34.2 °C)]   Pulse:  [47-91]   Resp:  [18-27]   BP: ()/()   SpO2:  [94 %-100 %]     Intake/Output last 3 shifts:  I/O last 3 completed shifts:  In: 1088.8 [I.V.:888.8; IV Piggyback:200]  Out: 1138 [Urine:1138]    Intake/Output this shift:  I/O this shift:  In: -   Out: 201 [Urine:201]    Review of patient's allergies indicates:   Allergen Reactions    Pcn [penicillins]        Current Facility-Administered Medications   Medication    albuterol-ipratropium 2.5mg-0.5mg/3mL nebulizer solution 3 mL    amiodarone 360 mg/200 mL (1.8 mg/mL) infusion    atorvastatin tablet 20 mg    chlorhexidine 0.12 % solution 15 mL    dextrose 5 % 1,000 mL with sodium bicarbonate 1 mEq/mL (8.4 %) 150 mEq infusion    dextrose 50% injection 12.5 g    famotidine (PF) 20 mg/2 mL injection 20 mg    glucagon (human recombinant) injection 1 mg    heparin 25,000 units in dextrose 5% 250 mL (100 units/mL) bolus from bag; PRN BOLUS    heparin 25,000 units in dextrose 5% 250 mL (100 units/mL) bolus from bag; PRN BOLUS    heparin 25,000 units in dextrose 5% 250 mL (100 units/mL) infusion; FEMALE    insulin aspart  pen 1-10 Units    insulin detemir pen 10 Units    meperidine (PF) injection 25 mg    norepinephrine 4 mg in dextrose 5% 250 mL infusion (premix) (titrating)    propofol (DIPRIVAN) 10 mg/mL infusion     No current facility-administered medications on file prior to encounter.      Current Outpatient Prescriptions on File Prior to Encounter   Medication Sig    amitriptyline (ELAVIL) 25 MG tablet Take 25 mg by mouth nightly as needed.    amlodipine (NORVASC) 10 MG tablet Take 10 mg by mouth once daily.    atorvastatin (LIPITOR) 40 MG tablet Take 40 mg by mouth once daily.    ciprofloxacin (CIPRO) 250 MG tablet Take 250 mg by mouth 2 (two) times daily.    clindamycin (CLEOCIN) 300 MG capsule Take 300 mg by mouth 3 (three) times daily.    clopidogrel (PLAVIX) 75 mg tablet Take 75 mg by mouth once daily.    dipyridamole-aspirin 200-25 mg (AGGRENOX) 200-25 mg CM12 Take 1 capsule by mouth 2 (two) times daily.    furosemide (LASIX) 40 MG tablet Take 40 mg by mouth 2 (two) times daily.    gabapentin (NEURONTIN) 100 MG capsule Take 100 mg by mouth 3 (three) times daily.    hydrALAZINE (APRESOLINE) 50 MG tablet Take 50 mg by mouth 3 (three) times daily.    hydrochlorothiazide (HYDRODIURIL) 12.5 MG Tab Take 25 mg by mouth once daily.     hydrOXYzine (ATARAX) 25 MG tablet Take 25 mg by mouth 4 (four) times daily.    insulin glargine (LANTUS) 100 unit/mL injection Inject into the skin every evening.    isosorbide mononitrate (IMDUR) 30 MG 24 hr tablet Take 30 mg by mouth once daily.    lisinopril 10 MG tablet Take 40 mg by mouth once daily.     metoprolol tartrate (LOPRESSOR) 25 MG tablet Take 50 mg by mouth 2 (two) times daily.     predniSONE (DELTASONE) 20 MG tablet Take 40 mg by mouth once daily.    warfarin (COUMADIN) 5 MG tablet Take 5 mg by mouth once daily.       Physical Exam:  General appearance: intubated and sedated. On hypothermia protocol   Head: Normocephalic  Neck: no adenopathy, no carotid  bruit, no JVD  Lungs:  clear to auscultation bilaterally. Intubated   Chest wall: no tenderness  Heart: regular beena rate and rhythm, S1, S2 normal, no murmur, click, rub or gallop  Abdomen: soft, non-tender; bowel sounds normal; no masses,  no organomegaly  Extremities: extremities normal, atraumatic, no cyanosis or edema  Pulses: 2+ and symmetric  Skin: Skin color, texture, turgor normal. No rashes or lesions  Neurologic: sedated     Laboratory:  Chemistry:   Lab Results   Component Value Date     03/08/2017    K 3.6 03/08/2017     03/08/2017    CO2 18 (L) 03/08/2017    BUN 22 (H) 03/08/2017    CREATININE 1.3 03/08/2017    CALCIUM 9.3 03/08/2017     Cardiac Markers:   Lab Results   Component Value Date    TROPONINI 1.977 (H) 03/07/2017    TROPONINI 1.977 (H) 03/07/2017     Cardiac Markers (Last 3):   Lab Results   Component Value Date    TROPONINI 1.977 (H) 03/07/2017    TROPONINI 1.977 (H) 03/07/2017    TROPONINI 4.543 (H) 03/07/2017     CBC:   Lab Results   Component Value Date    WBC 24.32 (H) 03/08/2017    HGB 14.1 03/08/2017    HCT 41.5 03/08/2017    HCT 44 03/07/2017    MCV 84 03/08/2017     03/08/2017     Lipids: No results found for: CHOL, TRIG, HDL, LDLDIRECT  Coagulation:   Lab Results   Component Value Date    INR 1.1 03/08/2017    APTT 79.4 (H) 03/08/2017       Diagnostic Results:  ECG: Reviewed  X-Ray: Reviewed  US: Reviewed  CT: Reviewed  Echo: Reviewed      ASSESSMENT/PLAN:     Patient Active Problem List   Diagnosis    Cardiac arrest    Ventricular fibrillation    Ischemic cardiomyopathy    CAD, multiple vessel    History of PTCA    History of CVA (cerebrovascular accident)    Abnormal ECG    Tobacco abuse    Acute respiratory failure with hypercapnia    Frankie coma scale total score 3-8    Type 2 diabetes mellitus with hyperglycemia    Swallowing difficulty    Metabolic acidosis    Type 2 diabetes mellitus with hyperglycemia, with long-term current use of insulin         No recommended changes at this time to medical therapy besides adding Statin. Complete hypothermia protocol. Continue Heparin gtt and Amio gtt for now. Will continue to follow and give further recommendations following rewarming phase and assessment of neuro status.     Chart reviewed. Patient examined by Dr. Killian  and agrees with plan that has been outlined.

## 2017-03-08 NOTE — PLAN OF CARE
Problem: Patient Care Overview  Goal: Plan of Care Review  Outcome: Ongoing (interventions implemented as appropriate)  Cont hypothermic protocol. Monitor labs q 4hrs. Temps q one hour. Sedation for seizure/myoclonic jerking. Heparin gtt. Safety maintained. POC with Son.

## 2017-03-09 PROBLEM — E87.20 METABOLIC ACIDOSIS: Status: RESOLVED | Noted: 2017-01-01 | Resolved: 2017-01-01

## 2017-03-09 PROBLEM — G40.201: Status: ACTIVE | Noted: 2017-01-01

## 2017-03-09 PROBLEM — E87.8 ELECTROLYTE IMBALANCE: Status: ACTIVE | Noted: 2017-01-01

## 2017-03-09 PROBLEM — I10 UNCONTROLLED HYPERTENSION: Status: ACTIVE | Noted: 2017-01-01

## 2017-03-09 PROBLEM — G93.1 HYPOXIC ENCEPHALOPATHY: Status: ACTIVE | Noted: 2017-01-01

## 2017-03-09 PROBLEM — I25.10 CAD, MULTIPLE VESSEL: Chronic | Status: ACTIVE | Noted: 2017-01-01

## 2017-03-09 NOTE — PROGRESS NOTES
SALMA Kraft notified of increase in muscle tremor frequency and strength since rewarming.  New order for Ativan noted - see MAR.  Remains unresponsive to all stimuli and is on Propofol at 50 mcg/kg/min

## 2017-03-09 NOTE — PROGRESS NOTES
Cardiology Progress Note        SUBJECTIVE:   Patient post V-fib arrest and defib x4. Has completed hypothermia protocol. Had jerky, questionable seizure like activity after rewarming. Given Ativan and placed back on propofol with improvement.  She remains on Amio and Heparin drips.  Troponin trended down. Awaiting Neuro consult.       OBJECTIVE:     Vital Signs (Most Recent)  Temp: 99 °F (37.2 °C) (03/09/17 0900)  Pulse: 93 (03/09/17 1000)  Resp: 20 (03/09/17 1000)  BP: (!) 85/52 (03/09/17 1000)  SpO2: 98 % (03/09/17 1000)    Vital Signs Range (Last 24H):  Temp:  [91.4 °F (33 °C)-99 °F (37.2 °C)]   Pulse:  []   Resp:  [19-31]   BP: ()/()   SpO2:  [85 %-100 %]     Intake/Output last 3 shifts:  I/O last 3 completed shifts:  In: 4253.8 [I.V.:4253.8]  Out: 2139 [Urine:2039; Drains:100]    Intake/Output this shift:  I/O this shift:  In: 505.8 [I.V.:405.8; IV Piggyback:100]  Out: 22 [Urine:22]    Review of patient's allergies indicates:   Allergen Reactions    Pcn [penicillins]        Current Facility-Administered Medications   Medication    albuterol-ipratropium 2.5mg-0.5mg/3mL nebulizer solution 3 mL    amiodarone 360 mg/200 mL (1.8 mg/mL) infusion    aspirin chewable tablet 81 mg    atorvastatin tablet 20 mg    chlorhexidine 0.12 % solution 15 mL    dextrose 50% injection 12.5 g    famotidine (PF) 20 mg/2 mL injection 20 mg    glucagon (human recombinant) injection 1 mg    heparin 25,000 units in dextrose 5% 250 mL (100 units/mL) bolus from bag; PRN BOLUS    heparin 25,000 units in dextrose 5% 250 mL (100 units/mL) bolus from bag; PRN BOLUS    heparin 25,000 units in dextrose 5% 250 mL (100 units/mL) infusion; FEMALE    hydrALAZINE injection 20 mg    insulin aspart pen 1-10 Units    insulin detemir pen 10 Units    levetiracetam in NaCl (iso-os) IVPB 1,500 mg    lorazepam (ATIVAN) 2 mg/mL injection    lorazepam injection 2 mg    magnesium sulfate 2g in water 50mL IVPB (premix)     magnesium sulfate 2g in water 50mL IVPB (premix)    metoprolol injection 5 mg    potassium chloride 40 mEq in 100 mL IVPB (FOR CENTRAL LINE ADMINISTRATION ONLY)    And    potassium chloride 20 mEq in 100 mL IVPB (FOR CENTRAL LINE ADMINISTRATION ONLY)    And    potassium chloride 40 mEq in 100 mL IVPB (FOR CENTRAL LINE ADMINISTRATION ONLY)    propofol (DIPRIVAN) 10 mg/mL infusion (NON-TITRATING)     No current facility-administered medications on file prior to encounter.      Current Outpatient Prescriptions on File Prior to Encounter   Medication Sig    amitriptyline (ELAVIL) 25 MG tablet Take 25 mg by mouth nightly as needed.    amlodipine (NORVASC) 10 MG tablet Take 10 mg by mouth once daily.    atorvastatin (LIPITOR) 40 MG tablet Take 40 mg by mouth once daily.    ciprofloxacin (CIPRO) 250 MG tablet Take 250 mg by mouth 2 (two) times daily.    clindamycin (CLEOCIN) 300 MG capsule Take 300 mg by mouth 3 (three) times daily.    clopidogrel (PLAVIX) 75 mg tablet Take 75 mg by mouth once daily.    dipyridamole-aspirin 200-25 mg (AGGRENOX) 200-25 mg CM12 Take 1 capsule by mouth 2 (two) times daily.    furosemide (LASIX) 40 MG tablet Take 40 mg by mouth 2 (two) times daily.    gabapentin (NEURONTIN) 100 MG capsule Take 100 mg by mouth 3 (three) times daily.    hydrALAZINE (APRESOLINE) 50 MG tablet Take 50 mg by mouth 3 (three) times daily.    hydrochlorothiazide (HYDRODIURIL) 12.5 MG Tab Take 25 mg by mouth once daily.     hydrOXYzine (ATARAX) 25 MG tablet Take 25 mg by mouth 4 (four) times daily.    insulin glargine (LANTUS) 100 unit/mL injection Inject into the skin every evening.    isosorbide mononitrate (IMDUR) 30 MG 24 hr tablet Take 30 mg by mouth once daily.    lisinopril 10 MG tablet Take 40 mg by mouth once daily.     metoprolol tartrate (LOPRESSOR) 25 MG tablet Take 50 mg by mouth 2 (two) times daily.     predniSONE (DELTASONE) 20 MG tablet Take 40 mg by mouth once daily.    warfarin  (COUMADIN) 5 MG tablet Take 5 mg by mouth once daily.       Physical Exam:  General appearance: intubated and sedated. On hypothermia protocol   Head: Normocephalic  Neck: no adenopathy, no carotid bruit, no JVD  Lungs: clear to auscultation bilaterally. Intubated   Chest wall: no tenderness  Heart: regular beena rate and rhythm, S1, S2 normal, no murmur, click, rub or gallop  Abdomen: soft, non-tender; bowel sounds normal; no masses, no organomegaly  Extremities: extremities normal, atraumatic, no cyanosis or edema  Pulses: 2+ and symmetric  Skin: Skin color, texture, turgor normal. No rashes or lesions  Neurologic: sedated     Laboratory:  Chemistry:   Lab Results   Component Value Date     03/09/2017    K 3.2 (L) 03/09/2017    CL 99 03/09/2017    CO2 28 03/09/2017    BUN 19 03/09/2017    CREATININE 1.1 03/09/2017    CALCIUM 8.8 03/09/2017     Cardiac Markers:   Lab Results   Component Value Date    TROPONINI 1.977 (H) 03/07/2017    TROPONINI 1.977 (H) 03/07/2017     Cardiac Markers (Last 3):   Lab Results   Component Value Date    TROPONINI 1.977 (H) 03/07/2017    TROPONINI 1.977 (H) 03/07/2017    TROPONINI 4.543 (H) 03/07/2017     CBC:   Lab Results   Component Value Date    WBC 18.59 (H) 03/09/2017    HGB 12.4 03/09/2017    HCT 35.8 (L) 03/09/2017    HCT 44 03/07/2017    MCV 81 (L) 03/09/2017     03/09/2017     Lipids: No results found for: CHOL, TRIG, HDL, LDLDIRECT  Coagulation:   Lab Results   Component Value Date    INR 1.0 03/09/2017    APTT 54.5 (H) 03/09/2017       Diagnostic Results:  ECG: Reviewed  X-Ray: Reviewed  US: Reviewed  CT: Reviewed  Echo: Reviewed      ASSESSMENT/PLAN:     Patient Active Problem List   Diagnosis    Cardiac arrest    Ventricular fibrillation    Ischemic cardiomyopathy    CAD, multiple vessel    History of PTCA    History of CVA (cerebrovascular accident)    Abnormal ECG    Tobacco abuse    Acute respiratory failure with hypercapnia    Hypoxic  encephalopathy    Type 2 diabetes mellitus with hyperglycemia    Epilepsy with partial complex seizures, with status epilepticus    Electrolyte imbalance    Uncontrolled hypertension        Plan:   Continue supportive care. EEG per Neuro recommendations. Continue Heparin and Amio gtt, ASA and Statin.  Neuro status very concerning .    Chart reviewed. Patient examined by Dr. Killian and agrees with plan that has been outlined.

## 2017-03-09 NOTE — CONSULTS
Consult Note  Neurological ICU      Consult Requested By: Sagar Mitchell MD  Reason for Consult: Post cardiac arrest coma with seizures    SUBJECTIVE:     History of Present Illness:  Review of medical records indicate that the patient was heard to fall to the floor at home and was unresponsive.  Family started CPR until EMS arrived.  Initial rhythm was asystole or V-fib.  The patient received shock X 4 followed by epi and ACLS protocol to establish a rhythm.  She was intubated and transported to the hospital.  In the ER, she was evaluated by cardiology and was started on cooling, which continued until early today, now being rewarmed to 37 C.  During the night she manifested seizure activity consisting of generalized clonic movements.  She responded to lorazepam briefly, then was placed on propofol which has controlled the seizure activity.  She has remained comatose, on the ventilator since admission to the hospital.      The medical records indicate a long past history of CAD with cardiomyopathy, CHF, DM.  At the time of this consult, the sedation was removed for the examination.    Review of patient's allergies indicates:   Allergen Reactions    Pcn [penicillins]        Past Medical History:   Diagnosis Date    CAD, multiple vessel 3/7/2017    Cardiac arrest 3/7/2017    CHF (congestive heart failure)     Diabetes mellitus     History of CVA (cerebrovascular accident) 3/7/2017    History of PTCA 3/7/2017    Hypertension     Ischemic cardiomyopathy 3/7/2017    Tobacco abuse 3/7/2017    Ventricular fibrillation 3/7/2017     History reviewed. No pertinent surgical history.  History reviewed. No pertinent family history.  Social History   Substance Use Topics    Smoking status: Never Smoker    Smokeless tobacco: None    Alcohol use No        Review of Systems:  Review of systems not obtained due to patient factors Patient is comatose, intubated and sedated.    OBJECTIVE:     Vital Signs (Most  Recent)  Temp: 98.1 °F (36.7 °C) (03/09/17 0700)  Pulse: (!) 115 (03/09/17 0706)  Resp: (!) 24 (03/09/17 0706)  BP: 126/83 (03/09/17 0700)  SpO2: (!) 93 % (03/09/17 0706)    Vital Signs Range (Last 24H):  Temp:  [91.4 °F (33 °C)-98.1 °F (36.7 °C)]   Pulse:  []   Resp:  [19-29]   BP: ()/()   SpO2:  [85 %-100 %]   Ventilator Data (Last 24H):     Vent Mode: A/C  Oxygen Concentration (%):  [30] 30  Resp Rate Total:  [22 br/min-33 br/min] 25 br/min  Vt Set:  [450 mL] 450 mL  PEEP/CPAP:  [5 cmH20] 5 cmH20  Pressure Support:  [0 cmH20] 0 cmH20  Mean Airway Pressure:  [7.8 npN82-79 cmH20] 15 cmH20    Hemodynamic Parameters (Last 24H):       Physical Exam:  General: appears acutely ill, intubated, morbidly obese  Head: normocephalic, atraumatic  Eyes:  pupils responsive, positive findings: absent oculocephalic reflex  Neck: supple, symmetrical, trachea midline, no JVD  Chest Wall: not examined  Heart: regular rate and rhythm  Abdomen: normal findings: no masses palpable and no organomegaly and abnormal findings:  hypoactive bowel sounds  Neurologic: Sedated  Mental status: alertness: comatose  Cranial nerves: II: pupils right 2 mm, left 2 mm, direct pupil reaction to light bilaterally, consensual pupil reaction to light bilaterally, III,IV,VI: extraocular muscles absent oculocephalic reflex, V,VII: corneal reflex present bilaterally, IX,X: gag reflex reduced  Sensory: No response to nail bed pressure in either hand but has withdrawal to pain stimulus in both feet.  Motor:Flaccid both arms, both legs.  No active movement seen  Reflexes: Absent stretch reflexes, both arms and legs.  Plantar response is moot    Lines/Drains:       Percutaneous Central Line Insertion/Assessment - triple lumen  03/07/17 right internal jugular (Active)   Dressing biopatch in place;dressing dry and intact 3/9/2017  7:02 AM   Securement catheter securement device utilized;secured w/ sutures 3/9/2017  7:02 AM   Additional Site Signs  no erythema;no warmth;no edema;no pain;no palpable cord;no streak formation;no drainage 3/9/2017  7:02 AM   Distal Patency/Care infusing 3/9/2017  7:02 AM   Medial Patency/Care infusing 3/9/2017  7:02 AM   Proximal Patency/Care infusing 3/9/2017  7:02 AM   Dressing Change Due 03/14/17 3/9/2017  7:02 AM   Daily Line Review Performed 3/9/2017  7:02 AM   Number of days:2            Peripheral IV - Single Lumen 03/07/17 0845 Right Antecubital (Active)   Site Assessment Clean;Dry;Intact;No redness;No swelling 3/9/2017  7:02 AM   Line Status Infusing 3/9/2017  7:02 AM   Dressing Status Clean;Dry;Intact 3/9/2017  7:02 AM   Dressing Change Due 03/11/17 3/7/2017  2:30 PM   Site Change Due 03/11/17 3/7/2017  2:30 PM   Reason Not Rotated Not due 3/9/2017  3:05 AM   Number of days:1            Arterial Line 03/07/17 1530 Left Radial (Active)   Site Assessment Clean;Dry;Intact 3/9/2017  7:02 AM   Line Status Pulsatile blood flow 3/9/2017  7:02 AM   Art Line Waveform Appropriate 3/9/2017  7:02 AM   Arterial Line Interventions Zeroed and calibrated;Leveled;Connections checked and tightened;Flushed per protocol 3/9/2017  7:02 AM   Color/Movement/Sensation Capillary refill less than 3 sec 3/9/2017  7:02 AM   Dressing Type Transparent 3/9/2017  7:02 AM   Dressing Status Biopatch in place;Clean;Dry;Intact 3/9/2017  7:02 AM   Dressing Intervention New dressing 3/9/2017  7:02 AM   Dressing Change Due 03/14/17 3/9/2017  7:02 AM   Number of days:1            NG/OG Tube 03/07/17 1150 [place] sump 16 Fr. Right mouth (Active)   Placement Check placement verified by x-ray 3/9/2017  7:02 AM   pH Aspirate Result 3 3/8/2017  7:05 PM   Tolerance no signs/symptoms of discomfort 3/9/2017  7:02 AM   Securement taped to commercial device 3/9/2017  7:02 AM   Clamp Status/Tolerance unclamped 3/9/2017  7:02 AM   Suction Setting/Drainage Method suction at;low;intermittent setting 3/9/2017  7:02 AM   Insertion Site Appearance no redness, warmth, tenderness,  "skin breakdown, drainage 3/9/2017  7:02 AM   Drainage Green 3/9/2017  7:02 AM   Flush/Irrigation flushed w/;water;no resistance met 3/9/2017  7:02 AM   Tube Output(mL)(Include Discarded Residual) 100 mL 3/8/2017  7:05 PM   Number of days:1            Urethral Catheter 03/07/17 1146 Latex;Temperature probe (Active)   Site Assessment Clean;Intact 3/9/2017  7:02 AM   Collection Container Other (Comment) 3/9/2017  7:02 AM   Securement Method secured to top of thigh w/ adhesive device 3/9/2017  7:02 AM   Catheter Care Performed yes 3/9/2017  3:05 AM   Reason for Continuing Urinary Catheterization Critically ill in ICU requiring intensive monitoring 3/9/2017  7:02 AM   CAUTI Prevention Bundle StatLock in place w 1" slack;No dependent loops or kinks 3/9/2017  7:02 AM   Output (mL) 3 mL 3/9/2017  7:00 AM   Number of days:1       Laboratory:  CBC:   Recent Labs  Lab 03/09/17  0337   WBC 18.59*   RBC 4.42   HGB 12.4   HCT 35.8*      MCV 81*   MCH 28.1   MCHC 34.6       Chest X-Ray: Not reviewed    Diagnostic Results:  CT: brain reviewed.  No hemorrhage.    ASSESSMENT/PLAN:     1.  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  Continue the sedation for now.  Will need to EEG also.    Critical Care Time greater than: 1 Hour  "

## 2017-03-09 NOTE — EICU
Called for possible seizure like activity. Will give Ativan. Follow Neuro exams for change in status.     Shashi Kraft MD

## 2017-03-09 NOTE — SUBJECTIVE & OBJECTIVE
Interval History:  Cool phase to complete at 2 pm.  No acute events.    Review of Systems   Unable to perform ROS: Intubated     Objective:     Vital Signs (Most Recent):  Temp: (!) 91.6 °F (33.1 °C) (03/08/17 1900)  Pulse: 74 (03/08/17 1924)  Resp: (!) 23 (03/08/17 1924)  BP: (!) 158/92 (03/08/17 1800)  SpO2: 100 % (03/08/17 1924) Vital Signs (24h Range):  Temp:  [90.9 °F (32.7 °C)-93.6 °F (34.2 °C)] 91.6 °F (33.1 °C)  Pulse:  [47-81] 74  Resp:  [19-27] 23  SpO2:  [94 %-100 %] 100 %  BP: ()/() 158/92     Weight: 103.4 kg (228 lb)  Body mass index is 36.8 kg/(m^2).    Intake/Output Summary (Last 24 hours) at 03/08/17 1941  Last data filed at 03/08/17 1804   Gross per 24 hour   Intake          2424.82 ml   Output             1770 ml   Net           654.82 ml      Physical Exam   Constitutional: No distress.   HENT:   Head: Normocephalic and atraumatic.   Mouth/Throat: Oropharynx is clear and moist.   Eyes: Conjunctivae are normal. Pupils are equal, round, and reactive to light.   Neck: Neck supple. No JVD present. No thyromegaly present.   Cardiovascular: Normal rate and regular rhythm.  Exam reveals no gallop and no friction rub.    No murmur heard.  Pulmonary/Chest: Effort normal and breath sounds normal. She has no wheezes. She has no rales.   Abdominal: Soft. Bowel sounds are normal. She exhibits no distension. There is no tenderness. There is no rebound and no guarding.   Musculoskeletal: Normal range of motion. She exhibits no edema or deformity.   Lymphadenopathy:     She has no cervical adenopathy.   Neurological: She has normal reflexes.   Skin: Skin is dry. No rash noted.   Cool.    Nursing note and vitals reviewed.      Significant Labs:   CBC:   Recent Labs  Lab 03/07/17  0855 03/07/17  1047 03/08/17  0405   WBC 24.17*  --  24.32*   HGB 12.9  --  14.1   HCT 40.0 44 41.5     --  287     CMP:   Recent Labs  Lab 03/07/17  0855  03/08/17  0801 03/08/17  1132 03/08/17  1542     < > 138  138 138   K 3.9  < > 4.0 3.6 3.4*     < > 108 105 103   CO2 11*  < > 13* 18* 22*   *  < > 241* 252* 190*   BUN 15  < > 22* 22* 21*   CREATININE 1.2  < > 1.3 1.3 1.1   CALCIUM 9.2  < > 9.5 9.3 9.1   PROT 7.1  --   --   --   --    ALBUMIN 3.0*  --   --   --   --    BILITOT 0.3  --   --   --   --    ALKPHOS 122  --   --   --   --    AST 98*  --   --   --   --    ALT 82*  --   --   --   --    ANIONGAP 25*  < > 17* 15 13   EGFRNONAA 50*  < > 45* 45* 55*   < > = values in this interval not displayed.  Coagulation:   Recent Labs  Lab 03/08/17  0405  03/08/17  1542   INR 1.1  --   --    APTT 78.1*  < > 65.1*   < > = values in this interval not displayed.    Significant Imaging: I have reviewed all pertinent imaging results/findings within the past 24 hours.

## 2017-03-09 NOTE — PROGRESS NOTES
Ativan given as ordered - immediate effect, tremors have ceased.  HR and B/P decreased.  Still breathing over vent rate.  Remains unresponsive to stimuli.

## 2017-03-09 NOTE — PLAN OF CARE
Patient remains intubated with critical medical status. CM placed call to patient's son Karla to discuss medical status and discharge POC. Son states he will visit today during afternoon visiting hours and we can discussed discharge POC then.  CM met with patient's son Karla, discussed discharge POC. Karla states waiting for EEG results, then will discuss with other family members to with draw or not with draw medical care.

## 2017-03-09 NOTE — PLAN OF CARE
Problem: Patient Care Overview  Goal: Plan of Care Review  Outcome: Ongoing (interventions implemented as appropriate)  Patient resting quietly in bed with no signs of distress. Pt has seizure like activity whenever sedation weaned down. VSS. Son at bedside, update given, POC discussed.

## 2017-03-09 NOTE — NURSING
Dr Brennan here, propofol gtt reduced from 50mcg/kg/min to 25mcg/kg/min. Patient started having seizure like activity within 5 minutes of reduction of med. Patient had repetitive jerking of rt arm and hand, and some minor jerking in feet. Witness by Dr Brennan. Propofol gtt resumed at previous rate.

## 2017-03-09 NOTE — CONSULTS
Noted tubefeed ordered per RD recommendations Peptamen Bariatric (VHP) at 45ml/hr. Noted rate increase of propofol to 31ml/hr providing 818 calories.  Continue tubefeed at ordered rate. Will continue to monitor and follow tubefeed tolerance and rate changes of propofol.

## 2017-03-09 NOTE — PROGRESS NOTES
[facility] Hospital Medicine  Progress Note    Patient Name: Tara Anderson  MRN: [id number]  Patient Class: IP- Inpatient   Admission Date: 3/7/2017  Length of Stay: 1 days  Attending Physician: Sagar Mitchell MD  Primary Care Provider: Primary Doctor No        Subjective:     Principal Problem:Cardiac arrest    HPI:  Witnessed arrest at home with family at 0800.  Her daughter started CPR and EMS arrived shortly after.  When EMS took over her rhythm was Ventricular fibrillation and she was shocked twice.  By ACLS protocols she received Amiodarone 300 mg and three rounds of Epinephrine before recovery of spontaneous circulation.  She was intubated and sedated with propofol and started on a heparin drip.  In the emergency room she was started on the therapeutic cooling protocol.  Limited additional history but her son added that her anti-coagulation had recently been changed.  [place] been establishing with new doctors.    Hospital Course:       Interval History:  Cool phase to complete at 2 pm.  No acute events.    Review of Systems   Unable to perform ROS: Intubated     Objective:     Vital Signs (Most Recent):  Temp: (!) 91.6 °F (33.1 °C) (03/08/17 1900)  Pulse: 74 (03/08/17 1924)  Resp: (!) 23 (03/08/17 1924)  BP: (!) 158/92 (03/08/17 1800)  SpO2: 100 % (03/08/17 1924) Vital Signs (24h Range):  Temp:  [90.9 °F (32.7 °C)-93.6 °F (34.2 °C)] 91.6 °F (33.1 °C)  Pulse:  [47-81] 74  Resp:  [19-27] 23  SpO2:  [94 %-100 %] 100 %  BP: ()/() 158/92     Weight: 103.4 kg (228 lb)  Body mass index is 36.8 kg/(m^2).    Intake/Output Summary (Last 24 hours) at 03/08/17 1941  Last data filed at 03/08/17 1804   Gross per 24 hour   Intake          2424.82 ml   Output             1770 ml   Net           654.82 ml      Physical Exam   Constitutional: No distress.   HENT:   Head: Normocephalic and atraumatic.   Mouth/Throat: Oropharynx is clear and moist.   Eyes:  Conjunctivae are normal. Pupils are equal, round, and reactive to light.   Neck: Neck supple. No JVD present. No thyromegaly present.   Cardiovascular: Normal rate and regular rhythm.  Exam reveals no gallop and no friction rub.    No murmur heard.  Pulmonary/Chest: Effort normal and breath sounds normal. She has no wheezes. She has no rales.   Abdominal: Soft. Bowel sounds are normal. She exhibits no distension. There is no tenderness. There is no rebound and no guarding.   Musculoskeletal: Normal range of motion. She exhibits no edema or deformity.   Lymphadenopathy:     She has no cervical adenopathy.   Neurological: She has normal reflexes.   Skin: Skin is dry. No rash noted.   Cool.    Nursing note and vitals reviewed.      Significant Labs:   CBC:   Recent Labs  Lab 03/07/17  0855 03/07/17  1047 03/08/17  0405   WBC 24.17*  --  24.32*   HGB 12.9  --  14.1   HCT 40.0 44 41.5     --  287     CMP:   Recent Labs  Lab 03/07/17  0855  03/08/17  0801 03/08/17  1132 03/08/17  1542     < > 138 138 138   K 3.9  < > 4.0 3.6 3.4*     < > 108 105 103   CO2 11*  < > 13* 18* 22*   *  < > 241* 252* 190*   BUN 15  < > 22* 22* 21*   CREATININE 1.2  < > 1.3 1.3 1.1   CALCIUM 9.2  < > 9.5 9.3 9.1   PROT 7.1  --   --   --   --    ALBUMIN 3.0*  --   --   --   --    BILITOT 0.3  --   --   --   --    ALKPHOS 122  --   --   --   --    AST 98*  --   --   --   --    ALT 82*  --   --   --   --    ANIONGAP 25*  < > 17* 15 13   EGFRNONAA 50*  < > 45* 45* 55*   < > = values in this interval not displayed.  Coagulation:   Recent Labs  Lab 03/08/17  0405  03/08/17  1542   INR 1.1  --   --    APTT 78.1*  < > 65.1*   < > = values in this interval not displayed.    Significant Imaging: I have reviewed all pertinent imaging results/findings within the past 24 hours.    Assessment/Plan:      * Cardiac arrest  Witnessed arrest with initiation of CPR without delay by family.  Patient was in ventricular fibrillation when EMS  arrived and she received DC cardioversion and ACLS.  Hemodynamically stable but unresponsive upon arrival at the ED.  No known recent illness or medical problems that may have precipitated arrest.  Her anti-coagulation was recently adjusted but no report of being sub-therapeutic in that process.  Therapeutic hypothermia initiated int he ER 07 March approximately 10 am with goal temp at 2 pm.  Warming to start at about 2 pm today.    Ventricular fibrillation  Electrical cardioversion by EMS.  Received Amiodarone during resuscitation and on continuous infusion.  Monitoring and correcting electrolytes.  Hemodynamically stable in normal sinus.  She was temporarily in atrial fibrillation an admission.    CAD, multiple vessel  Troponin elevated on admission and expected to rise.  Continue to trend.  A new MI could have been the inciting cause of arrest.    VTE Risk Mitigation     None          Sagar Mitchell MD  Department of Hospital Medicine   [facility]    Critical care time:  30 Minutes

## 2017-03-09 NOTE — ASSESSMENT & PLAN NOTE
Witnessed arrest with initiation of CPR without delay by family.  Patient was in ventricular fibrillation when EMS arrived and she received DC cardioversion and ACLS.  Hemodynamically stable but unresponsive upon arrival at the ED.  No known recent illness or medical problems that may have precipitated arrest.  Her anti-coagulation was recently adjusted but no report of being sub-therapeutic in that process.  Therapeutic hypothermia initiated int he ER 07 March approximately 10 am with goal temp at 2 pm.  Warming to start at about 2 pm today.

## 2017-03-09 NOTE — PLAN OF CARE
Problem: Patient Care Overview  Goal: Plan of Care Review  Outcome: Ongoing (interventions implemented as appropriate)  POC and interventions discussed with family.  Including hypothermia treatment and sedation.  Poor prognosis based on significant time without sufficient circulation in the field after cardiac failure per EMS notes.  VU.  Neuro - unresponsive to any stimuli.  No gag reflex noted when suctioning ETT.  No spont. Movements.  Has some involuntary jerking, muscle twitching at times.  Vitals stable.  Heart SR vs ST with occasional PAC.  Rewarming phase of hypothermia ongoing.  Electrolytes monitored.  Oxygenating well on ventilator.  Bicarb gtt infusing as ordered.

## 2017-03-09 NOTE — PROGRESS NOTES
Progress Note  Critical Care    Admit Date: 3/7/2017   LOS: 2 days     Follow-up For: Resp Failure and Alt MS post Cardiac Arrest     SUBJECTIVE:   Change over last 24 hours: reached normo-thermia at 0630 hr this AM.  Right sided seizure activity with weaning of Propofol.      ROS:  Review of Systems   Unable to perform ROS: Intubated       Continuous Infusions:   sodium chloride 0.9%      amiodarone 0.5 mg/min (03/09/17 0700)    heparin (porcine) in D5W 9 Units/kg/hr (03/09/17 0700)    propofol       Review of patient's allergies indicates:   Allergen Reactions    Pcn [penicillins]        OBJECTIVE:     Vital Signs (Most Recent)  Temp: 98.1 °F (36.7 °C) (03/09/17 0700)  Pulse: (!) 115 (03/09/17 0706)  Resp: (!) 24 (03/09/17 0706)  BP: 126/83 (03/09/17 0700)  SpO2: (!) 93 % (03/09/17 0706)    Vital Signs Range (Last 24H):  Temp:  [91.4 °F (33 °C)-98.1 °F (36.7 °C)]   Pulse:  []   Resp:  [19-29]   BP: ()/()   SpO2:  [85 %-100 %]     I & O (Last 24H):  Intake/Output Summary (Last 24 hours) at 03/09/17 0844  Last data filed at 03/09/17 0700   Gross per 24 hour   Intake          3828.82 ml   Output              970 ml   Net          2858.82 ml       Ventilator Data (Last 24H):     Vent Mode: A/C  Oxygen Concentration (%):  [30] 30  Resp Rate Total:  [22 br/min-33 br/min] 25 br/min  Vt Set:  [450 mL] 450 mL  PEEP/CPAP:  [5 cmH20] 5 cmH20  Pressure Support:  [0 cmH20] 0 cmH20  Mean Airway Pressure:  [7.8 rlA38-43 cmH20] 15 cmH20    Physical Exam:  Physical Exam   Constitutional: She appears lethargic. She appears to not be writhing in pain and not malnourished. She appears unhealthy. She has a sickly appearance. No distress. She is intubated.   HENT:   Head: Normocephalic and atraumatic.   Mouth/Throat: Oropharynx is clear and moist.   Eyes: Lids are normal. Pupils are equal, round, and reactive to light.   Neck: Neck supple. Carotid bruit is not present.       Cardiovascular: Regular rhythm.   Tachycardia present.    Pulses:       Radial pulses are 1+ on the right side, and 1+ on the left side.        Dorsalis pedis pulses are 1+ on the right side, and 1+ on the left side.   Pulmonary/Chest: Accessory muscle usage (mild to moderate) present. She is intubated. She is in respiratory distress (mild). She has wheezes. She has rales.   Abdominal: Soft. She exhibits no distension. Bowel sounds are hypoactive. There is no tenderness.   Genitourinary:   Genitourinary Comments: Perez    Musculoskeletal: Normal range of motion.   Lymphadenopathy:     She has no cervical adenopathy.   Neurological: She appears lethargic.   PERRLA 2mm.  Withdraws LEs to pain but not UEs.  No purposeful or spont movements.  Right sided seizure activity of Propofol infusion.  + corneal reflex.    Skin: Skin is warm and dry. She is not diaphoretic. No cyanosis.            Laboratory (Last 24H):  CBC:    Recent Labs  Lab 03/09/17 0337   WBC 18.59*   HGB 12.4   HCT 35.8*        CMP:    Recent Labs  Lab 03/09/17 0337   CALCIUM 8.8   ALBUMIN 2.9*   PROT 7.1      K 3.2*   CO2 28   CL 99   BUN 19   CREATININE 1.1   ALKPHOS 95   ALT 73*   *   BILITOT 0.4       Coagulation:   Recent Labs  Lab 03/09/17 0337   INR 1.0   APTT 54.5*     ABGs:   Recent Labs  Lab 03/09/17  0444   PH 7.464*   PCO2 38.1   HCO3 27.3   POCSATURATED 98   BE 4     Microbiology Results (last 7 days)     Procedure Component Value Units Date/Time    Blood culture [[id number]] Collected:  03/07/17 1008    Order Status:  Completed Specimen:  Blood from Line, Jugular, Internal Right Updated:  03/09/17 0622     Blood Culture, Routine No Growth to date     Blood Culture, Routine No Growth to date    Blood culture [[id number]] Collected:  03/07/17 0922    Order Status:  Completed Specimen:  Blood from Line, Subclavian, Right Updated:  03/08/17 2312     Blood Culture, Routine No Growth to date     Blood Culture, Routine No Growth to date    Culture, Respiratory  with Gram Stain [[id number]]     Order Status:  No result Specimen:  Respiratory from Endotracheal Aspirate     Urine culture [[id number]] Collected:  03/07/17 1140    Order Status:  Sent Specimen:  Urine from Urine, Catheterized Updated:  03/07/17 1143    Blood culture [[id number]]     Order Status:  Canceled Specimen:  Blood     Blood culture [[id number]]     Order Status:  Canceled Specimen:  Blood     Culture, Respiratory with Gram Stain [[id number]]     Order Status:  Canceled Specimen:  Respiratory from Sputum           Chest X-Ray:         Film and report reviewed:  Mild pulm edema +/- pleural effusions    ASSESSMENT/PLAN:     Problem   Acute Respiratory Failure With Hypercapnia   Hypoxic Encephalopathy   Epilepsy With Partial Complex Seizures, With Status Epilepticus   Uncontrolled Hypertension   Cad, Multiple Vessel   Type 2 Diabetes Mellitus With Hyperglycemia   Electrolyte Imbalance   Cardiac Arrest   Ventricular Fibrillation   Tobacco Abuse   Metabolic Acidosis (Resolved)       PLAN:    1. Neuro: neuro consulted.  Cont Neuro monitoring.  Seizures noted with Propofol weaning.  Add Keppra, cont Propofol and EEG pending.  Initial CT head unremarkable.    2. Pulmonary: Vent settings reviewed and adjusted.  Cont Duonebs and OET suctioning.  SBT once seizures controlled and neuro status improves.     3. Cardiac: Cards following no intervention planned.  Cont Heparin infusion, Amiodarone infusion, IV Lopressor and statin.  Add ASA    4. Renal: accurate I/O;     5. Infectious Disease: suspect reactive leukocytosis; follow lactate, wbc trend.  Blood, Urine and Sputum cultures NGTD.    6. Hematology/Oncology: monitor for bleeding    7. Endocrine: Inc Detemir and cont SSI    8. Fluids/Electrolytes/Nutrition/GI: replace electrolytes per protocol.  Start TF and H/L IVFs.  Check trig in AM    9. Musculoskeletal: ROM    10. Pain Management: Avoid narcotics for neuro monitoring    11. Discharge and Palliative Care: Poor  prognosis for meaningful neuro recovery.  Will discuss with family today.  DNR per family wishes.       Preventive Measures:   Nutrition: Goal: Tolerate oral diet and meet caloric needs  Stress Ulcer: Pepcid  DVT: heparin infusion  Head of Bed/Reposition: Elevate HOB and turn Q1-2 hours   Physical Therapy: consult once stable   Sedation Interruption: off sedation  Vent Day: #3  OG Day: #3  Central Line Right IJ Day: #3  Arterial Line Left Radial Day: #3  Perez Day: #3  Code Status: DNR     Counseling/Consultation: I have discussed the care of this patient in detail with multidisciplinary team during rounds, bedside nursing staff and Dr. Benoit and Dr. Mitchell and Dr. Brennan    Critical Care Time greater than: 78 minutes    Critical care was time spent personally by me on the following activities: development of treatment plan with patient or surrogate and bedside caregivers, discussions with consultants, evaluation of patient's response to treatment, examination of patient, ordering and performing treatments and interventions, ordering and review of laboratory studies, ordering and review of radiographic studies, pulse oximetry, re-evaluation of patient's condition.  This critical care time did not overlap with that of any other provider or involve time of any procedures.    [facility]ivo [facility]  [facility]

## 2017-03-10 PROBLEM — A41.9 SEPTIC SHOCK: Status: ACTIVE | Noted: 2017-01-01

## 2017-03-10 PROBLEM — N17.9 AKI (ACUTE KIDNEY INJURY): Status: ACTIVE | Noted: 2017-01-01

## 2017-03-10 PROBLEM — R65.21 SEPTIC SHOCK: Status: ACTIVE | Noted: 2017-01-01

## 2017-03-10 PROBLEM — J69.0 ASPIRATION PNEUMONIA OF RIGHT LOWER LOBE: Status: ACTIVE | Noted: 2017-01-01

## 2017-03-10 PROBLEM — I10 UNCONTROLLED HYPERTENSION: Status: RESOLVED | Noted: 2017-01-01 | Resolved: 2017-01-01

## 2017-03-10 NOTE — PLAN OF CARE
As per discussion during MDR son to speak with  regarding EEG results, then son will decide with draw of care status.      03/10/17 1133   Discharge Reassessment   Assessment Type Discharge Planning Reassessment   Can the patient answer the patient profile reliably? No, cognitively impaired   How does the patient rate their overall health at the present time? Poor   Describe the patient's ability to walk at the present time. Major restrictions/daily assistance from another person   How often would a person be available to care for the patient? Whenever needed   Number of comorbid conditions (as recorded on the chart) Five or more   Discharge plan remains the same: (see CM comment)   Discharge Plan A Other   Discharge Plan B Other   Change in patient condition or support system Yes   Involved the patient/caregiver in establishing a new discharge plan: Yes

## 2017-03-10 NOTE — PROGRESS NOTES
Progress Note  Neurological ICU    Admit Date: 3/7/2017   LOS: 3 days     SUBJECTIVE:     Follow-up For:  Anoxic encephalopathy, seizures.  The patient remains deeply comatose, but without recognized seizures.  Now becoming hypotensive.  Has had brief temp elevation during the night.  Currently breathing above vent.  Tube feedings in place, seeming to be tolerated without residual.  EEG showed high amplitude generalized slowing with periodic high amplitude, symmetrical sharp waves with background slowing, but without obvious seizure activity.      Continuous Infusions:   amiodarone 0.5 mg/min (03/10/17 0605)    heparin (porcine) in D5W 6.963 Units/kg/hr (03/10/17 0605)    norepinephrine bitartrate-D5W      propofol 50 mcg/kg/min (03/10/17 0605)     Scheduled Meds:   albuterol-ipratropium 2.5mg-0.5mg/3mL  3 mL Nebulization Q6H    aspirin  81 mg Per OG tube Daily    atorvastatin  20 mg Oral Daily    azithromycin  500 mg Intravenous Q24H    chlorhexidine  15 mL Mouth/Throat BID    famotidine (PF)  20 mg Intravenous BID    insulin detemir  10 Units Subcutaneous BID    levetiracetam IVPB  1,500 mg Intravenous Q12H    magnesium sulfate IVPB  2 g Intravenous Once    metoprolol  5 mg Intravenous Q6H    metronidazole  500 mg Intravenous Q8H    sodium chloride 0.9%  1,000 mL Intravenous Once     PRN Meds:acetaminophen, dextrose 50%, glucagon (human recombinant), heparin (PORCINE), heparin (PORCINE), hydrALAZINE, insulin aspart, lorazepam    Review of patient's allergies indicates:   Allergen Reactions    Pcn [penicillins]        OBJECTIVE:     Vital Signs (Most Recent)  Temp: 99.4 °F (37.4 °C) (03/10/17 0305)  Pulse: 89 (03/10/17 0705)  Resp: (!) 25 (03/10/17 0705)  BP: (!) 47/31 (03/10/17 0705)  SpO2: (!) 91 % (03/10/17 0705)    Vital Signs Range (Last 24H):  Temp:  [98.2 °F (36.8 °C)-101.1 °F (38.4 °C)]   Pulse:  []   Resp:  [17-44]   BP: ()/(23-81)   SpO2:  [87 %-100 %]     I & O (Last  24H):  Intake/Output Summary (Last 24 hours) at 03/10/17 0719  Last data filed at 03/10/17 0605   Gross per 24 hour   Intake           2783.4 ml   Output              154 ml   Net           2629.4 ml     Ventilator Data (Last 24H):     Vent Mode: A/C  Oxygen Concentration (%):  [30-50] 50  Resp Rate Total:  [20 br/min-42 br/min] 25 br/min  Vt Set:  [450 mL] 450 mL  PEEP/CPAP:  [5 cmH20] 5 cmH20  Pressure Support:  [0 cmH20] 0 cmH20  Mean Airway Pressure:  [9.6 clY89-95 cmH20] 11 cmH20    Hemodynamic Parameters (Last 24H):       Physical Exam:  General: appears acutely ill, intubated  Head: normocephalic, atraumatic  Eyes:  pupils pinpoint, pupils responsive  Neck: supple, symmetrical, trachea midline, no JVD  Lungs:  clear to auscultation bilaterally  Heart: regular rate and rhythm  Abdomen: normal findings: no masses palpable and soft, non-tender and abnormal findings:  hypoactive bowel sounds  Neurologic: Mental status: alertness: comatose  Cranial nerves: II: pupils pinpoint bilaterally, direct pupil reaction to light bilaterally, III,IV,VI: extraocular muscles absent oculocephalic reflexes, V,VII: corneal reflex absent bilaterally, IX,X: gag reflex absent  Sensory: No response to deep pain bilaterally  Motor:Flaccid both upper and both lower extremities.  No active movements seen  Reflexes: Absent stretch reflexes.  Plantar stimulation is moot bilaterally    Lines/Drains:       Percutaneous Central Line Insertion/Assessment - triple lumen  03/07/17 right internal jugular (Active)   Dressing biopatch in place;dressing dry and intact 3/10/2017  3:05 AM   Securement catheter securement device utilized;secured w/ sutures 3/10/2017  3:05 AM   Additional Site Signs no erythema;no warmth;no edema;no pain;no palpable cord;no streak formation;no drainage 3/10/2017  3:05 AM   Distal Patency/Care infusing 3/10/2017  3:05 AM   Medial Patency/Care infusing 3/10/2017  3:05 AM   Proximal Patency/Care infusing 3/10/2017  3:05  AM   Dressing Change Due 03/14/17 3/9/2017  3:00 PM   Daily Line Review Performed 3/10/2017  3:05 AM   Number of days:3            Peripheral IV - Single Lumen 03/07/17 0845 Right Antecubital (Active)   Site Assessment Clean;Dry;Intact;No redness;No swelling 3/10/2017  3:05 AM   Line Status Infusing 3/10/2017  3:05 AM   Dressing Status Clean;Dry;Intact 3/10/2017  3:05 AM   Dressing Change Due 03/11/17 3/7/2017  2:30 PM   Site Change Due 03/11/17 3/7/2017  2:30 PM   Reason Not Rotated Not due 3/10/2017  3:05 AM   Number of days:2            Arterial Line 03/07/17 1530 Left Radial (Active)   Site Assessment Clean;Dry;Intact 3/10/2017  3:05 AM   Line Status Pulsatile blood flow 3/10/2017  3:05 AM   Art Line Waveform Appropriate 3/10/2017  3:05 AM   Arterial Line Interventions Zeroed and calibrated;Leveled;Connections checked and tightened;Flushed per protocol 3/10/2017  3:05 AM   Color/Movement/Sensation Capillary refill less than 3 sec 3/10/2017  3:05 AM   Dressing Type Transparent 3/10/2017  3:05 AM   Dressing Status Biopatch in place;Clean;Dry;Intact 3/10/2017  3:05 AM   Dressing Intervention New dressing 3/9/2017  3:00 PM   Dressing Change Due 03/14/17 3/9/2017  3:00 PM   Number of days:2            NG/OG Tube 03/07/17 1150 [place] sump 16 Fr. Right mouth (Active)   Placement Check placement verified by x-ray 3/10/2017  3:05 AM   pH Aspirate Result 4 3/9/2017  7:05 PM   Tolerance no signs/symptoms of discomfort 3/10/2017  3:05 AM   Securement taped to commercial device 3/10/2017  3:05 AM   Clamp Status/Tolerance unclamped 3/10/2017  3:05 AM   Suction Setting/Drainage Method suction at;low;intermittent setting 3/9/2017  3:00 PM   Insertion Site Appearance no redness, warmth, tenderness, skin breakdown, drainage 3/9/2017  3:00 PM   Drainage Green 3/9/2017  3:00 PM   Flush/Irrigation flushed w/;water;no resistance met 3/9/2017  3:00 PM   Feeding Method continuous 3/10/2017  3:05 AM   Current Rate (mL/hr) 45 mL/hr  "3/9/2017  7:05 PM   Goal Rate (mL/hr) 45 mL/hr 3/9/2017  7:05 PM   Tube Output(mL)(Include Discarded Residual) 100 mL 3/8/2017  7:05 PM   Rate Formula Tube Feeding (mL/hr) 45 mL/hr 3/9/2017  7:05 PM   Intake (mL) - Formula Tube Feeding 45 3/10/2017  6:05 AM   Residual Amount (ml) 10 ml 3/9/2017  7:05 PM   Number of days:2            Urethral Catheter 03/07/17 1146 [place];Temperature probe (Active)   Site Assessment Clean;Intact 3/10/2017  3:05 AM   Collection Container Other (Comment) 3/10/2017  3:05 AM   Securement Method secured to top of thigh w/ adhesive device 3/10/2017  3:05 AM   Catheter Care Performed yes 3/10/2017  3:05 AM   Reason for Continuing Urinary Catheterization Critically ill in ICU requiring intensive monitoring 3/10/2017  3:05 AM   CAUTI Prevention Bundle StatLock in place w 1" slack;Intact seal between catheter & drainage tubing;Drainage bag off the floor;Drainage bag below bladder;Drainage bag not overfilled (<2/3 full);No dependent loops or kinks 3/9/2017  7:05 PM   Output (mL) 0 mL 3/10/2017  5:05 AM   Number of days:2       Laboratory (Last 24H):  CBC:    Recent Labs  Lab 03/10/17  0341   WBC 7.38   HGB 11.9*   HCT 35.4*        CMP:    Recent Labs  Lab 03/10/17  0341   CALCIUM 9.0      K 3.9   CO2 21*   CL 99   BUN 26*   CREATININE 2.3*     BMP:   Recent Labs  Lab 03/10/17  0341   *      K 3.9   CL 99   CO2 21*   BUN 26*   CREATININE 2.3*   CALCIUM 9.0   MG 1.7     Coagulation:   Recent Labs  Lab 03/09/17  0337 03/10/17  0341   INR 1.0  --    APTT 54.5* 37.8*     Cardiac Markers: No results for input(s): CKMB, TROPONINT, MYOGLOBIN in the last 168 hours.  Labs within the past 24 hours have been reviewed.    Chest X-Ray: CXR not reviewed personally.  Report reviewed    Diagnostic Results:  EEG:  generalized slowing with periodic high amplitude symmetrial sharp waves, at times followed by voltage suppression for 3 to 4 seconds.    ASSESSMENT/PLAN:     Preventive " Measures: Nutrition: Goal: Tolerate tube feedings while family decides level of care, Stress Ulcer: continue prophyllaxis, DVT: continue prophyllaxis, Sedation Interruption        Plan:  Neuro: Continue the Keppra 1500 mg BID.  Continue medical support, but prognosis for meaningful recovery is nil.  Family should decide level of care, but I would support comfort measures only.    Counseling/Consultation:Discussed with ICU personnel.  If family conference is wanted, I will be available.    Critical Care Time greater than: 45 Minutes

## 2017-03-10 NOTE — PROGRESS NOTES
Had conference with eldest child (hayder Acosta) at bedside about 2 hours ago and discussed care and status in detail with all questions answered.  Informed him she was more hemodynamically unstable and increased work of breathing on vent and worsening bedside neuro exam.  Son had planned on palliative care this weekend.  However, she is more agonal in resp effort on vent and is requiring 2 mcg/kg/min Levophed which is dramatic increase over last 2 hours and still remains hypotensive.  I called the son, Karla ZAMORA, again on the phone and gave these updates.  He stated that he has talked to his siblings and other family and wishes to have palliative vent withdrawal and comfort care now and for us not to wait for his arrival.  This decision was witnessed over phone by Dr. Mitchell who I also discussed care with in detail.      CASSY Olivo [facility]-BC

## 2017-03-10 NOTE — SUBJECTIVE & OBJECTIVE
Interval History:  Rewarmed and evidence of seizure when weaned sedation.    Review of Systems   Unable to perform ROS: Intubated     Objective:     Vital Signs (Most Recent):  Temp: (!) 101.1 °F (38.4 °C) (03/09/17 2158)  Pulse: (!) 135 (03/09/17 2235)  Resp: (!) 37 (03/09/17 2205)  BP: (!) 153/44 (03/09/17 2205)  SpO2: (!) 90 % (03/09/17 2235) Vital Signs (24h Range):  Temp:  [95.7 °F (35.4 °C)-101.1 °F (38.4 °C)] 101.1 °F (38.4 °C)  Pulse:  [] 135  Resp:  [17-44] 37  SpO2:  [85 %-100 %] 90 %  BP: ()/(20-98) 153/44     Weight: 103.4 kg (228 lb)  Body mass index is 36.8 kg/(m^2).    Intake/Output Summary (Last 24 hours) at 03/09/17 2314  Last data filed at 03/09/17 2205   Gross per 24 hour   Intake          3798.78 ml   Output              144 ml   Net          3654.78 ml      Physical Exam   Constitutional: No distress.   HENT:   Head: Normocephalic and atraumatic.   Mouth/Throat: Oropharynx is clear and moist.   Eyes: Conjunctivae are normal. Pupils are equal, round, and reactive to light.   Neck: Neck supple. No JVD present. No thyromegaly present.   Cardiovascular: Normal rate and regular rhythm.  Exam reveals no gallop and no friction rub.    No murmur heard.  Pulmonary/Chest: Effort normal and breath sounds normal. She has no wheezes. She has no rales.   Abdominal: Soft. Bowel sounds are normal. She exhibits no distension. There is no tenderness. There is no rebound and no guarding.   Musculoskeletal: Normal range of motion. She exhibits no edema or deformity.   Lymphadenopathy:     She has no cervical adenopathy.   Neurological: She has normal reflexes.   Skin: Skin is dry. No rash noted.   Cool.    Nursing note and vitals reviewed.      Significant Labs:   CBC:     Recent Labs  Lab 03/08/17  0405 03/09/17  0337   WBC 24.32* 18.59*   HGB 14.1 12.4   HCT 41.5 35.8*    243     CMP:     Recent Labs  Lab 03/08/17  1542 03/08/17  1944 03/09/17  0337 03/09/17  2040    140 140  --    K 3.4*  3.2* 3.2* 3.4*    103 99  --    CO2 22* 24 28  --    * 201* 207*  --    BUN 21* 19 19  --    CREATININE 1.1 1.1 1.1  --    CALCIUM 9.1 9.1 8.8  --    PROT  --   --  7.1  --    ALBUMIN  --   --  2.9*  --    BILITOT  --   --  0.4  --    ALKPHOS  --   --  95  --    AST  --   --  100*  --    ALT  --   --  73*  --    ANIONGAP 13 13 13  --    EGFRNONAA 55* 55* 55*  --      Coagulation:     Recent Labs  Lab 03/09/17  0337   INR 1.0   APTT 54.5*       Significant Imaging: I have reviewed all pertinent imaging results/findings within the past 24 hours.

## 2017-03-10 NOTE — PROGRESS NOTES
1905 Pt normothermic since this AM, artic sun pads removed with the help of Bala MIKE At this time.  Patient had large bowel movement and stool on pads.    2130 EICU notified of pt temp increasing, currently 101.1.  Tylenol ordered and Ice packs applied  Recheck on potassium 3.4, pt no longer hypothermic, EICU made aware and orders for 40 mEq given

## 2017-03-10 NOTE — ASSESSMENT & PLAN NOTE
Witnessed arrest with initiation of CPR without delay by family.  Patient was in ventricular fibrillation when EMS arrived and she received DC cardioversion and ACLS.  Hemodynamically stable but unresponsive upon arrival at the ED.  No known recent illness or medical problems that may have precipitated arrest.  Her anti-coagulation was recently adjusted but no report of being sub-therapeutic in that process.  Therapeutic hypothermia complete.  Evidence of global anoxic brain injury.

## 2017-03-10 NOTE — NURSING
Patient was extubated per request of patient's son and MD order at 16:25. Patient immediately started having bradycardia, agonal type breathing pattern. Patient's rhythm became asystole at 16:31. Dr Mitchell notified.

## 2017-03-10 NOTE — PROCEDURES
DATE OF PROCEDURE:  03/09/2017    HISTORY:  This is a 57-year-old patient, who has had an out of hospital cardiac   arrest.  She has been comatose since admission and was on cooling, but is now   warmed to normal temperature.  She has begun to show signs of intermittent   seizure, particularly involving the right arm and right leg.  EEG is being done   to assess seizure activity, but also to assess cerebral viability.    TECHNICAL PARAMETERS:  [employer] 10-20 electrode placement system with EKG   monitor.  No sedation was administered.  Portable tracing done in the Intensive   Care Unit.  The patient was on propofol at the beginning of the recording, but   this was discontinued during the tracing.    TECHNICAL SUMMARY:  Throughout this tracing, there is no normal alpha activity   present.  The record is marked primarily by low voltage irregular slow patterns   anywhere from 2 to 3 cycles per second, which is frontally predominant.  This   activity is present very intermittently throughout the entire tracing.  Against   this background is seen almost regularly discharging bifrontal sharp and slow   wave configuration.  This may occur as fast as one time per second, but then may   go several seconds without appearance.  When the propofol was discontinued,   irregular slow patterns continued with slightly higher amplitude at 2 to 3   cycles per second again frontally predominant.    Drowsy and sleep cycles were not recognized on the tracing.    Activation procedures were not attempted.    IMPRESSION:  Severely abnormal adult EEG, severe generalized slowing with   frequently discharging sharp and slow wave configuration.    CLINICAL IMPLICATIONS:  This record is considered to be consistent with a   history given of hypoxic episode.  This would be consistent with diffuse   pathology with diffuse irregular slow patterns.  The intermittently discharging   activity in the frontal region has the appearance, almost at  times of burst   suppression.  Clinical correlation is recommended and serial EEG tracings may be   of some benefit.      HUANG  dd: 03/09/2017 13:01:29 (CST)  td: 03/09/2017 22:19:42 (CST)  Doc ID   #[id number]  Job ID #[id number]    CC:

## 2017-03-10 NOTE — PROGRESS NOTES
Called to the patient's bedside for an update.  Over the last two hours she has been requiring progressively more vasopressors to maintain blood pressure and her breathing rate has increased significantly.  She is also exhibiting more myoclonic activity in her extremities.  I discussed her current status with her son Karla Anderson and our critical care nurse practitioner, Brandon Olivo, also discussed her current status with him.  I talked to him about her increased seizure activity, increasing pressor requirements, and increased breathing rate and that these factors indicate a progression of her brain injury and a likely increase in suffering.  He then wanted to stop aggressive supportive care and make her more comfortable, even if it meant she would die.    Sagar Mitchell MD  Internal Medicine

## 2017-03-10 NOTE — ASSESSMENT & PLAN NOTE
Secondary to cardiac arrest.  EEG with epileptic activity in both hemispheres.  Started on Keppra.

## 2017-03-10 NOTE — DISCHARGE SUMMARY
[facility] Hospital Medicine  Discharge Summary      Patient Name: Tara Anderson  MRN: [id number]  Admission Date: 3/7/2017  Hospital Length of Stay: 3 days  Discharge Date and Time:  03/10/2017 4:54 PM  Attending Physician: Sagar Mitchell MD   Discharging Provider: Sagar Mitchell MD  Primary Care Provider: Primary Doctor No      HPI:   Witnessed arrest at home with family at 0800.  Her daughter started CPR and EMS arrived shortly after.  When EMS took over her rhythm was Ventricular fibrillation and she was shocked twice.  By ACLS protocols she received Amiodarone 300 mg and three rounds of Epinephrine before recovery of spontaneous circulation.  She was intubated and sedated with propofol and started on a heparin drip.  In the emergency room she was started on the therapeutic cooling protocol.  Limited additional history but her son added that her anti-coagulation had recently been changed.  [place] has been establishing with new doctors.    * No surgery found *      Indwelling Lines/Drains at time of discharge:   Lines/Drains/Airways     Central Venous Catheter Line                 Percutaneous Central Line Insertion/Assessment - triple lumen  03/07/17 right internal jugular 3 days          Drain                 NG/OG Tube 03/07/17 1[address] [place] sump 16 Fr. Right mouth 3 days         Urethral Catheter 03/07/17 1146 Latex;Temperature probe 3 days          Arterial Line                 Arterial Line 03/07/17 1530 Left Radial 3 days              Hospital Course:   The patient was evaluated on arrival tot he emergency department and she was unresponsive but hemodynamically stable.  Therapeutic hypothermia protocol started and achieved goal temperature at about 2 pm and was held for 24 hours.  After rewarming, she remained hemodynamically stable but began to exhibit seizure-like activity.  Evaluation by Neurology including EEG showed diffuse changes and bilateral  seizure activity consistent with diffuse anoxic brain injury.  Seizures were then controlled with Keppra and propofol.  Family discussion determined her wishes to not be kept alive on life support if she were to suffer permanent disabling injury.  Over the next 48 hours, she exhibited no purposeful neurologic activity when weaned from sedation, but continued seizure.  Hemodynamically, she began to require increasing doses of vasopressors, became tachypneic to the 40s, and exhibited seizure activity under sedation with hypotension.  Her son was notified by phone and after discussion, he wished for aggressive support measures to be withdrawn and comfort focused treatment.  At 16:25 she was taken off the ventilator and her heart rhythm quickly transitioned to sinus bradycardia then asystole.  Time of death 16:31, 10 March.     Consults:   Consults         Status Ordering Provider     Inpatient consult to Neurology  Once     Provider:  Mike Brennan MD    Completed EVE PHOENIX     Inpatient consult to Pulmonology  Once     Provider:  (Not yet assigned)    Completed VARSHA KHAN     IP consult to dietary  Once     Provider:  (Not yet assigned)    Completed CAMMY GARZA          Significant Diagnostic Studies: Labs:   CMP   Recent Labs  Lab 03/08/17  1944 03/09/17  0337 03/09/17  2040 03/10/17  0341    140  --  137   K 3.2* 3.2* 3.4* 3.9    99  --  99   CO2 24 28  --  21*   * 207*  --  173*   BUN 19 19  --  26*   CREATININE 1.1 1.1  --  2.3*   CALCIUM 9.1 8.8  --  9.0   PROT  --  7.1  --   --    ALBUMIN  --  2.9*  --   --    BILITOT  --  0.4  --   --    ALKPHOS  --  95  --   --    AST  --  100*  --   --    ALT  --  73*  --   --    ANIONGAP 13 13  --  17*   ESTGFRAFRICA >60 >60  --  26*   EGFRNONAA 55* 55*  --  23*       Pending Diagnostic Studies:     Procedure Component Value Units Date/Time    Drug screen panel, emergency [[id number]] Collected:  03/07/17 1618    Order  Status:  Sent Lab Status:  In process Updated:  03/07/17 1618    Specimen:  Urine from Urine, Clean Catch     Neuron Specific Enolase, CSF [[id number]] Collected:  03/08/17 1630    Order Status:  Sent Lab Status:  In process Updated:  03/09/17 1349    Specimen:  CSF (Spinal Fluid) from Cerebrospinal Fluid     Protime-INR [[id number]] Collected:  03/07/17 1643    Order Status:  Sent Lab Status:  In process Updated:  03/07/17 1643    Specimen:  Blood from Blood     Narrative:       INR if patient is on warfarin prior to heparin therapy if not  ordered already    Triglycerides [[id number]] Collected:  03/10/17 0341    Order Status:  Sent Lab Status:  In process Updated:  03/10/17 1450    Specimen:  Blood from Blood         Final Active Diagnoses:    Diagnosis Date Noted POA    PRINCIPAL PROBLEM:  Anoxic brain injury [G93.1] 03/07/2017 Yes    Septic shock [A41.9, R65.21] 03/10/2017 Yes    Aspiration pneumonia of right lower lobe [J69.0] 03/10/2017 Yes    REGINALD (acute kidney injury) [N17.9] 03/10/2017 No    Type 2 diabetes mellitus with hyperglycemia, with long-term current use of insulin [E11.65, Z79.4]  Not Applicable    Epilepsy with partial complex seizures, with status epilepticus [G40.201] 03/09/2017 No    Electrolyte imbalance [E87.8] 03/09/2017 No    Cardiac arrest [I46.9] 03/07/2017 Yes    Ventricular fibrillation [I49.01] 03/07/2017 Yes    Ischemic cardiomyopathy [I25.5] 03/07/2017 Yes    CAD, multiple vessel [I25.10] 03/07/2017 Yes     Chronic    History of PTCA [Z98.61] 03/07/2017 Not Applicable    History of CVA (cerebrovascular accident) [Z86.73] 03/07/2017 Not Applicable    Abnormal ECG [R94.31] 03/07/2017 Yes    Tobacco abuse [Z72.0] 03/07/2017 Yes    Acute respiratory failure with hypercapnia [J96.02] 03/07/2017 Yes    Type 2 diabetes mellitus with hyperglycemia [E11.65] 03/07/2017 Yes      Problems Resolved During this Admission:    Diagnosis Date Noted Date Resolved POA    Uncontrolled  hypertension [I10] 2017 03/10/2017 No    Metabolic acidosis [E87.2] 2017 Yes      No new Assessment & Plan notes have been filed under this hospital service since the last note was generated.  Service: Hospital Medicine      Discharged Condition:     Disposition:     Follow Up:    Patient Instructions:   No discharge procedures on file.  Medications:  None (patient  at medical facility)  Time spent on the discharge of patient: 30 minutes    Sagar Mitchell MD  Department of Hospital Medicine  [facility]

## 2017-03-10 NOTE — PLAN OF CARE
Problem: Patient Care Overview  Goal: Plan of Care Review  Outcome: Ongoing (interventions implemented as appropriate)  Patient status critical, minimal neurological responses, only pupillary response and respiratory drive present. Pt seen by Dr Brennan this AM. Vasopressor started this AM due to decreasing BP, titrating gtt according to orders. Son visited earlier, update given by Brandon Olivo NP.

## 2017-03-10 NOTE — NURSING
Called and spoke with patient's son Benjamin and informed him of his mother's death and at what time. He will call hospital back with name of  home.  Notified KYE of patient's death

## 2017-03-10 NOTE — PROGRESS NOTES
[facility] Hospital Medicine  Progress Note    Patient Name: Tara Anderson  MRN: [id number]  Patient Class: IP- Inpatient   Admission Date: 3/7/2017  Length of Stay: 2 days  Attending Physician: Sagar Mitchell MD  Primary Care Provider: Primary Doctor No        Subjective:     Principal Problem:Hypoxic encephalopathy    HPI:  Witnessed arrest at home with family at 0800.  Her daughter started CPR and EMS arrived shortly after.  When EMS took over her rhythm was Ventricular fibrillation and she was shocked twice.  By ACLS protocols she received Amiodarone 300 mg and three rounds of Epinephrine before recovery of spontaneous circulation.  She was intubated and sedated with propofol and started on a heparin drip.  In the emergency room she was started on the therapeutic cooling protocol.  Limited additional history but her son added that her anti-coagulation had recently been changed.  [place] and has been establishing with new doctors.    Hospital Course:       Interval History:  Rewarmed and evidence of seizure when weaned sedation.    Review of Systems   Unable to perform ROS: Intubated     Objective:     Vital Signs (Most Recent):  Temp: (!) 101.1 °F (38.4 °C) (03/09/17 2158)  Pulse: (!) 135 (03/09/17 2235)  Resp: (!) 37 (03/09/17 2205)  BP: (!) 153/44 (03/09/17 2205)  SpO2: (!) 90 % (03/09/17 2235) Vital Signs (24h Range):  Temp:  [95.7 °F (35.4 °C)-101.1 °F (38.4 °C)] 101.1 °F (38.4 °C)  Pulse:  [] 135  Resp:  [17-44] 37  SpO2:  [85 %-100 %] 90 %  BP: ()/(20-98) 153/44     Weight: 103.4 kg (228 lb)  Body mass index is 36.8 kg/(m^2).    Intake/Output Summary (Last 24 hours) at 03/09/17 2314  Last data filed at 03/09/17 2205   Gross per 24 hour   Intake          3798.78 ml   Output              144 ml   Net          3654.78 ml      Physical Exam   Constitutional: No distress.   HENT:   Head: Normocephalic and atraumatic.   Mouth/Throat: Oropharynx is  clear and moist.   Eyes: Conjunctivae are normal. Pupils are equal, round, and reactive to light.   Neck: Neck supple. No JVD present. No thyromegaly present.   Cardiovascular: Normal rate and regular rhythm.  Exam reveals no gallop and no friction rub.    No murmur heard.  Pulmonary/Chest: Effort normal and breath sounds normal. She has no wheezes. She has no rales.   Abdominal: Soft. Bowel sounds are normal. She exhibits no distension. There is no tenderness. There is no rebound and no guarding.   Musculoskeletal: Normal range of motion. She exhibits no edema or deformity.   Lymphadenopathy:     She has no cervical adenopathy.   Neurological: She has normal reflexes.   Skin: Skin is dry. No rash noted.   Cool.    Nursing note and vitals reviewed.      Significant Labs:   CBC:     Recent Labs  Lab 03/08/17  0405 03/09/17  0337   WBC 24.32* 18.59*   HGB 14.1 12.4   HCT 41.5 35.8*    243     CMP:     Recent Labs  Lab 03/08/17  1542 03/08/17  1944 03/09/17  0337 03/09/17  2040    140 140  --    K 3.4* 3.2* 3.2* 3.4*    103 99  --    CO2 22* 24 28  --    * 201* 207*  --    BUN 21* 19 19  --    CREATININE 1.1 1.1 1.1  --    CALCIUM 9.1 9.1 8.8  --    PROT  --   --  7.1  --    ALBUMIN  --   --  2.9*  --    BILITOT  --   --  0.4  --    ALKPHOS  --   --  95  --    AST  --   --  100*  --    ALT  --   --  73*  --    ANIONGAP 13 13 13  --    EGFRNONAA 55* 55* 55*  --      Coagulation:     Recent Labs  Lab 03/09/17 0337   INR 1.0   APTT 54.5*       Significant Imaging: I have reviewed all pertinent imaging results/findings within the past 24 hours.    Assessment/Plan:      * Hypoxic encephalopathy  Secondary to cardiac arrest.  EEG with epileptic activity in both hemispheres.  Started on Keppra.    Cardiac arrest  Witnessed arrest with initiation of CPR without delay by family.  Patient was in ventricular fibrillation when EMS arrived and she received DC cardioversion and ACLS.  Hemodynamically  stable but unresponsive upon arrival at the ED.  No known recent illness or medical problems that may have precipitated arrest.  Her anti-coagulation was recently adjusted but no report of being sub-therapeutic in that process.  Therapeutic hypothermia complete.  Evidence of global anoxic brain injury.    Ventricular fibrillation  Electrical cardioversion by EMS.  Received Amiodarone during resuscitation and on continuous infusion.  Monitoring and correcting electrolytes.  Hemodynamically stable in normal sinus.  She was temporarily in atrial fibrillation an admission.    VTE Risk Mitigation     None          Sagar Mitchell MD  Department of Hospital Medicine   [facility]    Critical care time:  30 minutes

## 2017-03-10 NOTE — PROGRESS NOTES
Called to the patient's bedside to pronounce that Tara Anderson has . The patient ceased cardiac electrical activity at 1631. She is laying motionless in bed. Pupils fixed and dilated. Unresponsive to verbal and painful tactile stimuli. There were no heart or lung sounds on auscultation for one minute. No palpable peripheral pulses or carotid pulses. Corneal and oculocephalic reflexes absent.  The patient's son Karla was notified.     Sagar Mitchell MD  Internal Medicine

## 2017-03-10 NOTE — PLAN OF CARE
Problem: Patient Care Overview  Goal: Plan of Care Review  Outcome: Ongoing (interventions implemented as appropriate)  Patient neurologically no change this shift per previous RN's assessment.  Patient  With no gag or cough reflex, pupils minimally responsive, breathing over the vent, but not responding to pain at this time.  Patient spiked temp at start of shift 101.1, tylenol given per EICU and cooling blanket applied.  Current temp 99.8 F bladder .  Sinus tach on telemetry monitor and SBP >90 with MAP >60 this shift.  Minimal to no urine output.  O2 sats >92% via pulse oximetry on current vent settings.  No witnessed seizure activity over night.  Tube feedings at goal rate of 45 and pt tolerating well.  Patient turned and repositioned q2 hours with heels elevated off mattress.  No family present this shift to give updates to.

## 2017-03-10 NOTE — PROGRESS NOTES
[facility]   Cardiology  Progress Note    Patient Name: Tara Anderson  MRN: [id number]  Admission Date: 3/7/2017  Hospital Length of Stay: 3 days  Code Status: DNR   Attending Physician: Sagar Mitchell MD   Primary Care Physician: Primary Doctor No  Expected Discharge Date:   Principal Problem:Anoxic brain injury    Subjective:     PT SEEN AND EXAMINED IN ICU.  REMAINS INTUBATED WITH POOR NEUROLOGICAL FUNCTION.  NO ACUTE CARDIAC ISSUES OVERNIGHT.  S/P HYPOTHERMIA PROTOCOL.  REMAINS ON IV HEPARIN AND AMIO GTT AT TIME OF ROUNDS THIS AM.    ROS   UNABLE TO OBTAIN ROS DUE TO PT BEING UNRESPONSIVE  Objective:     Vital Signs (Most Recent):  Temp: 98.8 °F (37.1 °C) (03/10/17 1100)  Pulse: 100 (03/10/17 1200)  Resp: (!) 29 (03/10/17 1200)  BP: (!) 115/26 (03/10/17 1100)  SpO2: (!) 94 % (03/10/17 1200) Vital Signs (24h Range):  Temp:  [98.2 °F (36.8 °C)-101.1 °F (38.4 °C)] 98.8 °F (37.1 °C)  Pulse:  [] 100  Resp:  [17-44] 29  SpO2:  [86 %-100 %] 94 %  BP: ()/(23-68) 115/26     Weight: 106.7 kg (235 lb 3.7 oz)  Body mass index is 37.97 kg/(m^2).    SpO2: (!) 94 %  O2 Device (Oxygen Therapy): ventilator      Intake/Output Summary (Last 24 hours) at 03/10/17 1259  Last data filed at 03/10/17 1145   Gross per 24 hour   Intake          4056.37 ml   Output               99 ml   Net          3957.37 ml       Lines/Drains/Airways     Central Venous Catheter Line                 Percutaneous Central Line Insertion/Assessment - triple lumen  03/07/17 right internal jugular 3 days          Drain                 NG/OG Tube 03/07/17 1150 [place] sump 16 Fr. Right mouth 3 days         Urethral Catheter 03/07/17 1146 Latex;Temperature probe 3 days          Airway                 Airway - Non-Surgical 03/07/17 0812 3 days          Arterial Line                 Arterial Line 03/07/17 1530 Left Radial 2 days          Peripheral Intravenous Line                 Peripheral IV - Single Lumen 03/07/17 0845 Right  Antecubital 3 days                Physical Exam   Constitutional: She appears well-developed. No distress.   HENT:   Head: Normocephalic and atraumatic.   Neck: Neck supple. No JVD present. Carotid bruit is not present. No tracheal deviation present. No thyromegaly present.   Cardiovascular: Regular rhythm, S1 normal and S2 normal.  Tachycardia present.  Exam reveals no gallop, no S3 and no S4.    No murmur heard.  Pulmonary/Chest: She has rhonchi.   Abdominal: Soft. She exhibits no distension. There is no tenderness.   Lymphadenopathy:     She has no cervical adenopathy.   Neurological: She is unresponsive.   Skin: She is not diaphoretic.   Nursing note and vitals reviewed.      Significant Labs:   CMP   Recent Labs  Lab 03/08/17  1944 03/09/17  0337 03/09/17  2040 03/10/17  0341    140  --  137   K 3.2* 3.2* 3.4* 3.9    99  --  99   CO2 24 28  --  21*   * 207*  --  173*   BUN 19 19  --  26*   CREATININE 1.1 1.1  --  2.3*   CALCIUM 9.1 8.8  --  9.0   PROT  --  7.1  --   --    ALBUMIN  --  2.9*  --   --    BILITOT  --  0.4  --   --    ALKPHOS  --  95  --   --    AST  --  100*  --   --    ALT  --  73*  --   --    ANIONGAP 13 13  --  17*   ESTGFRAFRICA >60 >60  --  26*   EGFRNONAA 55* 55*  --  23*   , CBC   Recent Labs  Lab 03/09/17  0337 03/10/17  0341   WBC 18.59* 7.38   HGB 12.4 11.9*   HCT 35.8* 35.4*    214    and Troponin No results for input(s): TROPONINI in the last 48 hours.    I have reviewed all pertinent labs and cardiac studies.      Assessment and Plan:         Active Diagnoses:    Diagnosis Date Noted POA    PRINCIPAL PROBLEM:  Anoxic brain injury [G93.1] 03/07/2017 Yes    Septic shock [A41.9, R65.21] 03/10/2017 Yes    Aspiration pneumonia of right lower lobe [J69.0] 03/10/2017 Yes    REGINALD (acute kidney injury) [N17.9] 03/10/2017 No    Type 2 diabetes mellitus with hyperglycemia, with long-term current use of insulin [E11.65, Z79.4]  Not Applicable    Epilepsy with partial  complex seizures, with status epilepticus [G40.201] 03/09/2017 No    Electrolyte imbalance [E87.8] 03/09/2017 No    Cardiac arrest [I46.9] 03/07/2017 Yes    Ventricular fibrillation [I49.01] 03/07/2017 Yes    Ischemic cardiomyopathy [I25.5] 03/07/2017 Yes    CAD, multiple vessel [I25.10] 03/07/2017 Yes     Chronic    History of PTCA [Z98.61] 03/07/2017 Not Applicable    History of CVA (cerebrovascular accident) [Z86.73] 03/07/2017 Not Applicable    Abnormal ECG [R94.31] 03/07/2017 Yes    Tobacco abuse [Z72.0] 03/07/2017 Yes    Acute respiratory failure with hypercapnia [J96.02] 03/07/2017 Yes    Type 2 diabetes mellitus with hyperglycemia [E11.65] 03/07/2017 Yes      Problems Resolved During this Admission:    Diagnosis Date Noted Date Resolved POA    Uncontrolled hypertension [I10] 03/09/2017 03/10/2017 No    Metabolic acidosis [E87.2] 03/08/2017 03/09/2017 Yes       VTE Risk Mitigation     None          S/P CARDIAC ARREST  S/P HYPOTHERMIA PROTOCOL  POOR NEUROLOGICAL FUNCTION PER NEUROLOGY NOTES  MAY STOP IV HEPARIN TODAY  LOVENOX DVT PROPHYLAXIS  ADD PLAVIX 75 MG DAILY  STATIN DAILY  CHANGE AMIODARONE TO PO DOSING    POOR PROGNOSIS.    Dionte Killian MD  Cardiology  [facility]

## 2017-03-11 LAB — NSE CSF-MCNC: 95 NG/ML

## 2017-03-12 LAB — BACTERIA BLD CULT: NORMAL

## 2017-03-13 LAB
BACTERIA BLD CULT: NORMAL
BACTERIA SPEC AEROBE CULT: NORMAL
BACTERIA SPEC AEROBE CULT: NORMAL
GRAM STN SPEC: NORMAL